# Patient Record
Sex: FEMALE | Employment: FULL TIME | URBAN - NONMETROPOLITAN AREA
[De-identification: names, ages, dates, MRNs, and addresses within clinical notes are randomized per-mention and may not be internally consistent; named-entity substitution may affect disease eponyms.]

---

## 2019-04-19 ENCOUNTER — OFFICE VISIT (OUTPATIENT)
Dept: FAMILY MEDICINE | Facility: OTHER | Age: 29
End: 2019-04-19
Attending: PHYSICIAN ASSISTANT
Payer: COMMERCIAL

## 2019-04-19 VITALS
DIASTOLIC BLOOD PRESSURE: 80 MMHG | RESPIRATION RATE: 18 BRPM | HEART RATE: 80 BPM | HEIGHT: 65 IN | WEIGHT: 125.4 LBS | SYSTOLIC BLOOD PRESSURE: 102 MMHG | BODY MASS INDEX: 20.89 KG/M2

## 2019-04-19 DIAGNOSIS — R19.6 HALITOSIS: ICD-10-CM

## 2019-04-19 DIAGNOSIS — N89.8 VAGINAL DISCHARGE: ICD-10-CM

## 2019-04-19 DIAGNOSIS — Z11.51 SCREENING FOR HUMAN PAPILLOMAVIRUS: ICD-10-CM

## 2019-04-19 DIAGNOSIS — Z00.00 ROUTINE GENERAL MEDICAL EXAMINATION AT A HEALTH CARE FACILITY: Primary | ICD-10-CM

## 2019-04-19 DIAGNOSIS — Z86.32 HISTORY OF GESTATIONAL DIABETES MELLITUS: ICD-10-CM

## 2019-04-19 DIAGNOSIS — Z12.4 SCREENING FOR CERVICAL CANCER: ICD-10-CM

## 2019-04-19 LAB
ANION GAP SERPL CALCULATED.3IONS-SCNC: 6 MMOL/L (ref 3–14)
BUN SERPL-MCNC: 11 MG/DL (ref 7–25)
CALCIUM SERPL-MCNC: 9.3 MG/DL (ref 8.6–10.3)
CHLORIDE SERPL-SCNC: 105 MMOL/L (ref 98–107)
CO2 SERPL-SCNC: 27 MMOL/L (ref 21–31)
CREAT SERPL-MCNC: 0.74 MG/DL (ref 0.6–1.2)
GFR SERPL CREATININE-BSD FRML MDRD: >90 ML/MIN/{1.73_M2}
GLUCOSE SERPL-MCNC: 104 MG/DL (ref 70–105)
POTASSIUM SERPL-SCNC: 3.8 MMOL/L (ref 3.5–5.1)
SODIUM SERPL-SCNC: 138 MMOL/L (ref 134–144)
SPECIMEN SOURCE: NORMAL
TSH SERPL DL<=0.05 MIU/L-ACNC: 1.21 IU/ML (ref 0.34–5.6)
WET PREP SPEC: NORMAL

## 2019-04-19 PROCEDURE — 99395 PREV VISIT EST AGE 18-39: CPT | Performed by: NURSE PRACTITIONER

## 2019-04-19 PROCEDURE — 88142 CYTOPATH C/V THIN LAYER: CPT | Performed by: NURSE PRACTITIONER

## 2019-04-19 PROCEDURE — 87624 HPV HI-RISK TYP POOLED RSLT: CPT | Mod: ZL | Performed by: NURSE PRACTITIONER

## 2019-04-19 PROCEDURE — G0463 HOSPITAL OUTPT CLINIC VISIT: HCPCS

## 2019-04-19 PROCEDURE — 36415 COLL VENOUS BLD VENIPUNCTURE: CPT | Mod: ZL | Performed by: NURSE PRACTITIONER

## 2019-04-19 PROCEDURE — G0123 SCREEN CERV/VAG THIN LAYER: HCPCS | Performed by: NURSE PRACTITIONER

## 2019-04-19 PROCEDURE — 87210 SMEAR WET MOUNT SALINE/INK: CPT | Mod: ZL | Performed by: NURSE PRACTITIONER

## 2019-04-19 PROCEDURE — 80048 BASIC METABOLIC PNL TOTAL CA: CPT | Mod: ZL | Performed by: NURSE PRACTITIONER

## 2019-04-19 PROCEDURE — 84443 ASSAY THYROID STIM HORMONE: CPT | Mod: ZL | Performed by: NURSE PRACTITIONER

## 2019-04-19 RX ORDER — IBUPROFEN 600 MG/1
600 TABLET, FILM COATED ORAL
COMMUNITY
Start: 2016-10-22 | End: 2021-10-12

## 2019-04-19 ASSESSMENT — ANXIETY QUESTIONNAIRES
2. NOT BEING ABLE TO STOP OR CONTROL WORRYING: NOT AT ALL
IF YOU CHECKED OFF ANY PROBLEMS ON THIS QUESTIONNAIRE, HOW DIFFICULT HAVE THESE PROBLEMS MADE IT FOR YOU TO DO YOUR WORK, TAKE CARE OF THINGS AT HOME, OR GET ALONG WITH OTHER PEOPLE: NOT DIFFICULT AT ALL
7. FEELING AFRAID AS IF SOMETHING AWFUL MIGHT HAPPEN: NOT AT ALL
3. WORRYING TOO MUCH ABOUT DIFFERENT THINGS: NOT AT ALL
5. BEING SO RESTLESS THAT IT IS HARD TO SIT STILL: NOT AT ALL
6. BECOMING EASILY ANNOYED OR IRRITABLE: NOT AT ALL
1. FEELING NERVOUS, ANXIOUS, OR ON EDGE: NOT AT ALL
4. TROUBLE RELAXING: NOT AT ALL
GAD7 TOTAL SCORE: 0

## 2019-04-19 ASSESSMENT — PATIENT HEALTH QUESTIONNAIRE - PHQ9: SUM OF ALL RESPONSES TO PHQ QUESTIONS 1-9: 0

## 2019-04-19 ASSESSMENT — MIFFLIN-ST. JEOR: SCORE: 1289.69

## 2019-04-19 ASSESSMENT — PAIN SCALES - GENERAL: PAINLEVEL: NO PAIN (0)

## 2019-04-19 NOTE — NURSING NOTE
Patient presents to the clinic for a physical and pap.    Medication Reconciliation Completed.    Dmitry Mcelroy LPN  4/19/2019 12:53 PM

## 2019-04-19 NOTE — PROGRESS NOTES
SUBJECTIVE:   CC: Leonor Oconnell is an 28 year old woman who presents for preventive health visit.     She has 2 concerns today.  First, is halitosis.  She has been to the dentist and there are no concerns.  She has tried mouthwash, gum.  She denies any sinus symptoms including rhinorrhea, congestion, sinus pressure, postnasal drainage.  Denies any allergy symptoms including rhinorrhea, watery eyes, sneezing.  She does get tonsil stones.  Denies symptoms of sleep apnea including snoring, frequent nighttime wakening, not feeling rested in the mornings.    Her second concern is vaginal discharge.  Has been going on for a while.  Has a slight odor.  Occasional vaginal itching.  Denies urinary symptoms including urinary frequency, dysuria, hematuria.    She has a history of gestational diabetes that was diet controlled.  She does have concerns about diabetes, she feels like there are times when her blood sugar feels low.  Symptoms include feeling shaky.    Her last Pap was in 2014, normal Pap.  Denies history of abnormal Pap.  She does not have any breast concerns.    Immunizations are up-to-date.    Healthy Habits:    Do you get at least three servings of calcium containing foods daily (dairy, green leafy vegetables, etc.)? yes    Amount of exercise or daily activities, outside of work: no routine exercise, walks    Problems taking medications regularly not applicable    Medication side effects: not applicable    Have you had an eye exam in the past two years? no    Do you see a dentist twice per year? yes    Do you have sleep apnea, excessive snoring or daytime drowsiness?no      Today's PHQ-2 Score:   PHQ-2 ( 1999 Pfizer) 4/19/2019   Q1: Little interest or pleasure in doing things 0   Q2: Feeling down, depressed or hopeless 0   PHQ-2 Score 0     Do you feel safe in your environment? Yes    Social History     Tobacco Use     Smoking status: Never Smoker     Smokeless tobacco: Never Used   Substance Use Topics      Alcohol use: Yes     Comment: Alcoholic Drinks/day: not often     If you drink alcohol do you typically have >3 drinks per day or >7 drinks per week? No                     Reviewed orders with patient.  Reviewed health maintenance and updated orders accordingly - Yes  Labs reviewed in EPIC  BP Readings from Last 3 Encounters:   04/19/19 102/80   05/13/15 100/60   09/30/14 112/70    Wt Readings from Last 3 Encounters:   04/19/19 56.9 kg (125 lb 6.4 oz)   09/30/14 57.8 kg (127 lb 6.4 oz)   08/19/14 56 kg (123 lb 6.4 oz)                  Patient Active Problem List   Diagnosis     Gestational diabetes mellitus (GDM) in third trimester, gestational diabetes method of control unspecified     Multigravida     Term pregnancy delivered     Past Surgical History:   Procedure Laterality Date     ESOPHAGOSCOPY, GASTROSCOPY, DUODENOSCOPY (EGD), COMBINED      1999,to retreive swallowed quarter       Social History     Tobacco Use     Smoking status: Never Smoker     Smokeless tobacco: Never Used   Substance Use Topics     Alcohol use: Yes     Comment: Alcoholic Drinks/day: not often     Family History   Problem Relation Age of Onset     Hypertension Father         Hypertension     Hyperlipidemia Father         Hyperlipidemia     Diabetes Paternal Grandmother         Diabetes     Diabetes Paternal Aunt         Diabetes         Current Outpatient Medications   Medication Sig Dispense Refill     ibuprofen (ADVIL/MOTRIN) 600 MG tablet Take 600 mg by mouth       Allergies   Allergen Reactions     Cats      Other reaction(s): Sneezing     Dogs      Other reaction(s): Sneezing       Mammogram not appropriate for this patient based on age.    Pertinent mammograms are reviewed under the imaging tab.  History of abnormal Pap smear: NO - age 30-65 PAP every 5 years with negative HPV co-testing recommended     Reviewed and updated as needed this visit by clinical staff  Tobacco  Allergies  Meds  Problems  Med Hx  Surg Hx  Fam Hx   "Soc Hx          Reviewed and updated as needed this visit by Provider            ROS:  CONSTITUTIONAL: NEGATIVE for fever, chills, change in weight  INTEGUMENTARU/SKIN: NEGATIVE for worrisome rashes, moles or lesions  EYES: NEGATIVE for vision changes or irritation  ENT: POSITIVE for halitosis NEGATIVE for ear, mouth and throat problems  RESP: NEGATIVE for significant cough or SOB  BREAST: NEGATIVE for masses, tenderness or discharge  CV: NEGATIVE for chest pain, palpitations or peripheral edema  GI: NEGATIVE for nausea, abdominal pain, heartburn, or change in bowel habits  : POSITIVE for vaginal symptoms (HPI) NEGATIVE for unusual urinary symptoms. Periods are regular.  MUSCULOSKELETAL: NEGATIVE for significant arthralgias or myalgia  NEURO: NEGATIVE for weakness, dizziness or paresthesias  PSYCHIATRIC: NEGATIVE for changes in mood or affect    OBJECTIVE:   /80 (BP Location: Right arm, Patient Position: Sitting, Cuff Size: Adult Large)   Pulse 80   Resp 18   Ht 1.651 m (5' 5\")   Wt 56.9 kg (125 lb 6.4 oz)   Breastfeeding? No   BMI 20.87 kg/m       EXAM:  GENERAL: healthy, alert and no distress  EYES: Eyes grossly normal to inspection, PERRLA and conjunctivae and sclerae normal  HENT: ear canals and TM's normal, nose and mouth without ulcers or lesions  NECK: no adenopathy, no asymmetry, masses, or scars  RESP: lungs clear to auscultation - no rales, rhonchi or wheezes  BREAST: normal without masses, tenderness or nipple discharge and no palpable axillary masses or adenopathy  CV: regular rate and rhythm, normal S1 S2, no S3 or S4, no murmur, click or rub, no peripheral edema  ABDOMEN: soft, nontender, no hepatosplenomegaly, no masses and bowel sounds normal   (female): normal female external genitalia, normal urethral meatus, vaginal mucosa pink, moist, well rugated, and normal cervix/adnexa/uterus without masses or discharge  MS: no gross musculoskeletal defects noted, no edema  SKIN: no suspicious " lesions or rashes  NEURO: Normal strength and tone, mentation intact and speech normal  PSYCH: mentation appears normal, affect normal/bright    Diagnostic Test Results:  Results for orders placed or performed in visit on 04/19/19   TSH Reflex GH   Result Value Ref Range    TSH Reflex 1.21 0.34 - 5.60 IU/mL   Basic Metabolic Panel   Result Value Ref Range    Sodium 138 134 - 144 mmol/L    Potassium 3.8 3.5 - 5.1 mmol/L    Chloride 105 98 - 107 mmol/L    Carbon Dioxide 27 21 - 31 mmol/L    Anion Gap 6 3 - 14 mmol/L    Glucose 104 70 - 105 mg/dL    Urea Nitrogen 11 7 - 25 mg/dL    Creatinine 0.74 0.60 - 1.20 mg/dL    GFR Estimate >90 >60 mL/min/[1.73_m2]    GFR Estimate If Black >90 >60 mL/min/[1.73_m2]    Calcium 9.3 8.6 - 10.3 mg/dL   Wet Prep, Genital   Result Value Ref Range    Specimen Description Vagina     Wet Prep No Trichomonas seen     Wet Prep No yeast seen     Wet Prep No clue cells seen        ASSESSMENT/PLAN:   1. Routine general medical examination at a health care facility  Routine  - Basic Metabolic Panel; Future  - TSH Reflex GH; Future    2. Halitosis  Chronic. Has been to the dentist and has tried several things at home without relief. Discuss that next option would be further evaluating possible tonsil/sinus issues that could be contributing. I did discuss with her that more often than not a specific cause is not found and management is through the things she is currently already doing.  - OTOLARYNGOLOGY REFERRAL    3. Vaginal discharge  Chronic but since being seen for PAP would like to discuss. Wet prep is normal. Encouraged her to avoid scented detergents/products to see if there is an improvement.   - Wet Prep, Genital    4. Screening for cervical cancer  Routine  - Pap Screen Thin Prep with HPV - recommended age 30 - 65 years (select HPV order below)    5. Screening for human papillomavirus  Routine  - HPV High Risk Types DNA Cervical    6. History of gestational diabetes  "mellitus  Historical. Given history of GDM and symptoms she thinks are related to blood glucose, lab today. BMP with normal glucose. We did discuss need for small frequent meals throughout the day versus going extended periods of time to avoid the shaky feeling of low blood glucose. We also discussed her increased risk of Type II DM given history of GDM- she is not obese, tries to be active and does watch her diet. Encouraged her to continue with those things and follow-up with any concerns.   - Basic Metabolic Panel    COUNSELING:   Reviewed preventive health counseling, as reflected in patient instructions    BP Readings from Last 1 Encounters:   04/19/19 102/80     Estimated body mass index is 20.87 kg/m  as calculated from the following:    Height as of this encounter: 1.651 m (5' 5\").    Weight as of this encounter: 56.9 kg (125 lb 6.4 oz).           reports that she has never smoked. She has never used smokeless tobacco.      Counseling Resources:  ATP IV Guidelines  Pooled Cohorts Equation Calculator  Breast Cancer Risk Calculator  FRAX Risk Assessment  ICSI Preventive Guidelines  Dietary Guidelines for Americans, 2010  USDA's MyPlate  ASA Prophylaxis  Lung CA Screening    Mariia Johnson, CNP  [facility] AND [facility]  "

## 2019-04-20 ASSESSMENT — ANXIETY QUESTIONNAIRES: GAD7 TOTAL SCORE: 0

## 2019-04-25 ENCOUNTER — MYC MEDICAL ADVICE (OUTPATIENT)
Dept: FAMILY MEDICINE | Facility: OTHER | Age: 29
End: 2019-04-25

## 2019-04-25 DIAGNOSIS — B37.31 YEAST INFECTION OF THE VAGINA: Primary | ICD-10-CM

## 2019-04-25 LAB
COPATH REPORT: NORMAL
PAP: NORMAL

## 2019-04-25 RX ORDER — FLUCONAZOLE 150 MG/1
150 TABLET ORAL ONCE
Qty: 1 TABLET | Refills: 0 | Status: SHIPPED | OUTPATIENT
Start: 2019-04-25 | End: 2019-04-25

## 2019-04-26 LAB
FINAL DIAGNOSIS: ABNORMAL
HPV HR 12 DNA CVX QL NAA+PROBE: POSITIVE
HPV16 DNA SPEC QL NAA+PROBE: NEGATIVE
HPV18 DNA SPEC QL NAA+PROBE: NEGATIVE
SPECIMEN DESCRIPTION: ABNORMAL
SPECIMEN SOURCE CVX/VAG CYTO: ABNORMAL

## 2019-06-11 ENCOUNTER — OFFICE VISIT (OUTPATIENT)
Dept: OTOLARYNGOLOGY | Facility: OTHER | Age: 29
End: 2019-06-11
Attending: OTOLARYNGOLOGY
Payer: COMMERCIAL

## 2019-06-11 DIAGNOSIS — R19.6 HALITOSIS: Primary | ICD-10-CM

## 2019-06-11 PROCEDURE — G0463 HOSPITAL OUTPT CLINIC VISIT: HCPCS

## 2019-06-18 NOTE — PROGRESS NOTES
document embedded image  Patient Name:  Leonor Oconnell  YOB: 1990  MR Number:  [id number]  Acct Number: [id number]    cc: ~    ENT Progress Note    Date: 06/11/19    Visit Reasons: Halitosis      HPI: Chief complaint:  Halitosis    History  The patient is a 28-year-old female who comes to the office today with concerns regarding chronic halitosis.  She has seen her dentist and has had her teeth and gums cleared as a potential source of her bad breath.  She notices some thick hypopharyngeal secretions.  She does not have midfacial pain or pressure.  She has no purulence nasal drainage.  She denies chronic pain in her tonsils but does have occasional tonsillith production.  She has no symptomatic GERD.    Exam  Nasal-her septum is midline.  Her nasal membranes are uninflamed.  There is no purulence.  Neck-no masses or adenopathy  Oral cavity oropharynx-her tonsils are small without debris noted at this time  Head and neck integument-Clear  Indirect laryngoscopy-she does have some posterior laryngeal erythema.  I see no mucosal lesions are neuro deficits.  General-the patient appears well and in no distress  Neuro-there are no focal cranial nerve deficits      PFSH:     Medical History    Diagnosis unknown (Acute)  No eCW History       A&P  Assessment & Plan  (1) Halitosis:         Code(s):  R19.6 - Halitosis        I cannot determine a clear etiology for her symptoms on exam.  We will put her on a trial of acid suppression for 3 months.  She is welcome to return to discuss her issues further should her symptoms persist.  Departure  Other Medications:        New:    omeprazole magnesium (Acid Reducer (omeprazole)) 20 mg  PO DAILY 90 caps 0RF            Alexey Ward MD              06/11/19 1236   <Electronically signed by Alexey Ward MD> 06/11/19 1234

## 2020-03-11 ENCOUNTER — HEALTH MAINTENANCE LETTER (OUTPATIENT)
Age: 30
End: 2020-03-11

## 2020-12-27 ENCOUNTER — HEALTH MAINTENANCE LETTER (OUTPATIENT)
Age: 30
End: 2020-12-27

## 2021-02-20 ENCOUNTER — NURSE TRIAGE (OUTPATIENT)
Dept: NURSING | Facility: CLINIC | Age: 31
End: 2021-02-20

## 2021-02-20 ENCOUNTER — VIRTUAL VISIT (OUTPATIENT)
Dept: FAMILY MEDICINE | Facility: OTHER | Age: 31
End: 2021-02-20

## 2021-02-20 NOTE — PROGRESS NOTES
"Date: 2021 16:32:04  Clinician: Sujit Oconnell  Clinician NPI: [id number]  Patient: marli Oconnell  Patient : 1990  Patient Address: [address] [place] [address]  Patient Phone: [phone]  Visit Protocol: Oral mouth sores  Patient Summary:  marli is a 30 year old ( : 1990 ) female who initiated a [facility] Visit for cold sores. When asked the question \"Please sign me up to receive news, health information and promotions from [facility].\", marli responded \"No\".    Images of her skin condition were uploaded.   marli has not previously been diagnosed with cold sores (herpes simplex 1).   Symptom details  marli has active sores. Her symptoms started today. The symptoms consist of pain, tingling, and burning.   The sores are located on the lips and outside the mouth, near the lips on one side of the face. The sores are located together, in a cluster. She has 1 to 2 sores.   She denies feeling feverish. marli is not experiencing similar rash or sores on other parts of the body.   Precipitating events  marli experienced pain or unusual sensations (such as itching, burning, or tingling) in the location of sores/rash before it appeared.   Pertinent medical history  Her last outbreak was 1 to 3 months ago. She has had 2 outbreak(s) in the past three months. Her sores are typically recurrent.   marli has not taken any prescription medication to treat the sores in the past. marli has not taken oral antivirals for long-term suppression of the oral sores in the past 12 months.   marli does not have diabetes. She also denies having immunosuppressive conditions (e.g., chemotherapy, HIV, organ transplant, long-term use of steroids or other immunosuppressive medications, splenectomy).   marli does not need a return to work/school note.   marli does not smoke or use smokeless tobacco.   She denies pregnancy and denies breastfeeding. She has menstruated in the past month.     MEDICATIONS: No current " medications, ALLERGIES: NKDA  Clinician Response:  Dear marli,  Based on the information provided, the lesions or sores you have are most likely cold sores, also known as fever blisters.  The technical term is 'herpes simplex virus type 1'. Common symptoms include a tingling sensation on the mouth or lips, fluid filled blisters, or crusting on the skin.  A person who has this condition can transmit the infection to others through direct contact. After you have had cold sores, the virus remains dormant in your skin cells and can come back at a later time causing another cold sore.   Medication information  I am prescribing:     Valacyclovir 1 gram oral tablet. Take 2 tablets by mouth every 12 hours for 1 day. If you think your oral sores are returning, refill the medication and use it according to the instructions. Your prescription includes 1 refill.   If you become pregnant during this course of treatment with medication, stop taking the medication and contact your primary care provider.   Self care  To reduce the spread of the sores to other people and to other parts of your body, please take the following precautions:      Try not to itch or scratch the sores    If the lesions are draining, keep them covered    Be sure to wash your hands with soap and water often and after touching the sores    Wash towels and bed linens in hot water and dry them on high heat    When you have active sores:    Avoid direct contact such as kissing    Do not share silverware, cups, or towels           When to seek care  Please make an appointment to be seen in a clinic or urgent care if any of the following occur:     Your sores do not heal within 2 weeks    You develop new symptoms or your symptoms become worse      Diagnosis: Herpes Simplex (Cold Sores)  Diagnosis ICD: B00.9  Prescription: valacyclovir (Valtrex) 1 gram oral tablet 4 tablet, 1 days supply. Take 2 tablets by mouth every 12 hours for 1 day. Refills: 1, Refill as  needed: no, Allow substitutions: yes  Pharmacy: [id number] - [phone] - [address] [address]

## 2021-02-20 NOTE — TELEPHONE ENCOUNTER
Trying to get a prescription for cold sores. . Woke up today with a large cold sore. Cold sore is swollen, blistery and painful.  Has been using Abreva all day.  Gets frequent cold sores.  Leonor messaged her MD via [facility].  Advised [email or web address].    Reason for Disposition    [1] Herpes sores are a recurrent problem AND [2] caller wants a prescription medicine to take the next time they occur    Protocols used: COLD SORES (FEVER BLISTERS)-A-

## 2021-10-09 ENCOUNTER — HEALTH MAINTENANCE LETTER (OUTPATIENT)
Age: 31
End: 2021-10-09

## 2021-10-12 ENCOUNTER — OFFICE VISIT (OUTPATIENT)
Dept: FAMILY MEDICINE | Facility: OTHER | Age: 31
End: 2021-10-12
Payer: COMMERCIAL

## 2021-10-12 VITALS
WEIGHT: 130.4 LBS | SYSTOLIC BLOOD PRESSURE: 118 MMHG | BODY MASS INDEX: 21.73 KG/M2 | HEIGHT: 65 IN | OXYGEN SATURATION: 98 % | HEART RATE: 84 BPM | TEMPERATURE: 98.4 F | RESPIRATION RATE: 16 BRPM | DIASTOLIC BLOOD PRESSURE: 64 MMHG

## 2021-10-12 DIAGNOSIS — R05.9 COUGH: Primary | ICD-10-CM

## 2021-10-12 DIAGNOSIS — J01.10 ACUTE NON-RECURRENT FRONTAL SINUSITIS: ICD-10-CM

## 2021-10-12 PROCEDURE — C9803 HOPD COVID-19 SPEC COLLECT: HCPCS

## 2021-10-12 PROCEDURE — 99213 OFFICE O/P EST LOW 20 MIN: CPT | Performed by: FAMILY MEDICINE

## 2021-10-12 PROCEDURE — U0003 INFECTIOUS AGENT DETECTION BY NUCLEIC ACID (DNA OR RNA); SEVERE ACUTE RESPIRATORY SYNDROME CORONAVIRUS 2 (SARS-COV-2) (CORONAVIRUS DISEASE [COVID-19]), AMPLIFIED PROBE TECHNIQUE, MAKING USE OF HIGH THROUGHPUT TECHNOLOGIES AS DESCRIBED BY CMS-2020-01-R: HCPCS | Mod: ZL | Performed by: FAMILY MEDICINE

## 2021-10-12 RX ORDER — PSEUDOEPHEDRINE HCL 60 MG
60 TABLET ORAL EVERY 4 HOURS PRN
Qty: 30 TABLET | Refills: 0 | Status: SHIPPED | OUTPATIENT
Start: 2021-10-12 | End: 2022-02-04

## 2021-10-12 RX ORDER — AMOXICILLIN 875 MG
875 TABLET ORAL 2 TIMES DAILY
Qty: 20 TABLET | Refills: 0 | Status: SHIPPED | OUTPATIENT
Start: 2021-10-12 | End: 2021-10-22

## 2021-10-12 ASSESSMENT — MIFFLIN-ST. JEOR: SCORE: 1297.37

## 2021-10-12 ASSESSMENT — PAIN SCALES - GENERAL: PAINLEVEL: MODERATE PAIN (4)

## 2021-10-12 NOTE — PROGRESS NOTES
"  SUBJECTIVE:   Leonor Oconnell is a 31 year old female who presents to clinic today for the following health issues: Headache sinus congestion    Patient arrives here with a headache.  She points to her temple areas and also frontal area.  States is been going on for couple weeks.  She is not tried any medications.  She has not had a runny nose.  No fevers or chills.  Slight cough.  She has used Tylenol as needed.        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  Physical Exam  Constitutional:       Appearance: Normal appearance.   HENT:      Head: Normocephalic.      Right Ear: Tympanic membrane normal.      Left Ear: Tympanic membrane normal.      Nose: Rhinorrhea present.      Mouth/Throat:      Mouth: Mucous membranes are moist.   Cardiovascular:      Rate and Rhythm: Normal rate.   Pulmonary:      Effort: Pulmonary effort is normal.      Breath sounds: Normal breath sounds.   Neurological:      Mental Status: She is alert.         Diagnostic Test Results:  none     ASSESSMENT/PLAN:         (R05.9) Cough  (primary encounter diagnosis)  Comment: Likely viral URI.  Covid test is pending. "  We will start pseudoephedrine.  If no improvement towards the end of the week begin amoxicillin  Plan: Symptomatic COVID-19 Virus (Coronavirus) by PCR        Nose  (J01.10) Acute non-recurrent frontal sinusitis advised we will get back to patient when  Comment:   Plan: pseudoePHEDrine (SUDAFED) 60 MG tablet,         amoxicillin (AMOXIL) 875 MG tablet                Patrick Corea MD  [facility] [place]

## 2021-10-12 NOTE — NURSING NOTE
Patient here for cough, sinus pressure behind eyes, runny nose stuffy nose. Headache and loss of taste. Medication Reconciliation: complete.    Dayna Hargrove LPN  10/12/2021 9:27 AM

## 2021-10-13 LAB — SARS-COV-2 RNA RESP QL NAA+PROBE: POSITIVE

## 2021-12-28 ENCOUNTER — E-VISIT (OUTPATIENT)
Dept: URGENT CARE | Facility: URGENT CARE | Age: 31
End: 2021-12-28
Payer: COMMERCIAL

## 2021-12-28 DIAGNOSIS — N89.8 VAGINAL DISCHARGE: Primary | ICD-10-CM

## 2021-12-28 PROCEDURE — 99422 OL DIG E/M SVC 11-20 MIN: CPT | Performed by: PREVENTIVE MEDICINE

## 2021-12-28 NOTE — PATIENT INSTRUCTIONS
Thank you for choosing us for your care. Given your symptoms, I would like you to do a lab-only visit to determine what is causing them.  I have placed the orders.  Please schedule an appointment with the lab right here in [place], or call [phone].  I will let you know when the results are back and next steps to take.

## 2022-01-04 ASSESSMENT — ANXIETY QUESTIONNAIRES
GAD7 TOTAL SCORE: 0
2. NOT BEING ABLE TO STOP OR CONTROL WORRYING: NOT AT ALL
7. FEELING AFRAID AS IF SOMETHING AWFUL MIGHT HAPPEN: NOT AT ALL
6. BECOMING EASILY ANNOYED OR IRRITABLE: NOT AT ALL
1. FEELING NERVOUS, ANXIOUS, OR ON EDGE: NOT AT ALL
GAD7 TOTAL SCORE: 0
7. FEELING AFRAID AS IF SOMETHING AWFUL MIGHT HAPPEN: NOT AT ALL
4. TROUBLE RELAXING: NOT AT ALL
3. WORRYING TOO MUCH ABOUT DIFFERENT THINGS: NOT AT ALL
5. BEING SO RESTLESS THAT IT IS HARD TO SIT STILL: NOT AT ALL
GAD7 TOTAL SCORE: 0

## 2022-01-04 ASSESSMENT — PATIENT HEALTH QUESTIONNAIRE - PHQ9
SUM OF ALL RESPONSES TO PHQ QUESTIONS 1-9: 0
10. IF YOU CHECKED OFF ANY PROBLEMS, HOW DIFFICULT HAVE THESE PROBLEMS MADE IT FOR YOU TO DO YOUR WORK, TAKE CARE OF THINGS AT HOME, OR GET ALONG WITH OTHER PEOPLE: NOT DIFFICULT AT ALL
SUM OF ALL RESPONSES TO PHQ QUESTIONS 1-9: 0

## 2022-01-05 ENCOUNTER — OFFICE VISIT (OUTPATIENT)
Dept: FAMILY MEDICINE | Facility: OTHER | Age: 32
End: 2022-01-05
Attending: PHYSICIAN ASSISTANT
Payer: COMMERCIAL

## 2022-01-05 VITALS
RESPIRATION RATE: 16 BRPM | HEIGHT: 65 IN | TEMPERATURE: 98 F | DIASTOLIC BLOOD PRESSURE: 78 MMHG | WEIGHT: 129.6 LBS | HEART RATE: 95 BPM | SYSTOLIC BLOOD PRESSURE: 118 MMHG | BODY MASS INDEX: 21.59 KG/M2 | OXYGEN SATURATION: 99 %

## 2022-01-05 DIAGNOSIS — Z01.419 PAP TEST, AS PART OF ROUTINE GYNECOLOGICAL EXAMINATION: ICD-10-CM

## 2022-01-05 DIAGNOSIS — Z13.1 SCREENING FOR DIABETES MELLITUS: ICD-10-CM

## 2022-01-05 DIAGNOSIS — Z11.3 SCREEN FOR STD (SEXUALLY TRANSMITTED DISEASE): ICD-10-CM

## 2022-01-05 DIAGNOSIS — Z13.220 SCREENING CHOLESTEROL LEVEL: ICD-10-CM

## 2022-01-05 DIAGNOSIS — N89.8 VAGINAL DISCHARGE: ICD-10-CM

## 2022-01-05 DIAGNOSIS — Z00.00 ROUTINE HISTORY AND PHYSICAL EXAMINATION OF ADULT: Primary | ICD-10-CM

## 2022-01-05 PROBLEM — J01.10 ACUTE NON-RECURRENT FRONTAL SINUSITIS: Status: RESOLVED | Noted: 2021-10-12 | Resolved: 2022-01-05

## 2022-01-05 LAB
ANION GAP SERPL CALCULATED.3IONS-SCNC: 7 MMOL/L (ref 3–14)
BUN SERPL-MCNC: 14 MG/DL (ref 7–25)
C TRACH DNA SPEC QL PROBE+SIG AMP: NEGATIVE
CALCIUM SERPL-MCNC: 9.7 MG/DL (ref 8.6–10.3)
CHLORIDE BLD-SCNC: 101 MMOL/L (ref 98–107)
CHOLEST SERPL-MCNC: 216 MG/DL
CLUE CELLS: ABNORMAL
CO2 SERPL-SCNC: 27 MMOL/L (ref 21–31)
CREAT SERPL-MCNC: 0.82 MG/DL (ref 0.6–1.2)
FASTING STATUS PATIENT QL REPORTED: ABNORMAL
GFR SERPL CREATININE-BSD FRML MDRD: >90 ML/MIN/1.73M2
GLUCOSE BLD-MCNC: 93 MG/DL (ref 70–105)
HDLC SERPL-MCNC: 62 MG/DL (ref 23–92)
LDLC SERPL CALC-MCNC: 123 MG/DL
N GONORRHOEA DNA SPEC QL NAA+PROBE: NEGATIVE
NONHDLC SERPL-MCNC: 154 MG/DL
POTASSIUM BLD-SCNC: 4 MMOL/L (ref 3.5–5.1)
SODIUM SERPL-SCNC: 135 MMOL/L (ref 134–144)
TRICHOMONAS, WET PREP: ABNORMAL
TRIGL SERPL-MCNC: 157 MG/DL
WBC'S/HIGH POWER FIELD, WET PREP: ABNORMAL
YEAST, WET PREP: ABNORMAL

## 2022-01-05 PROCEDURE — 87210 SMEAR WET MOUNT SALINE/INK: CPT | Mod: ZL | Performed by: PHYSICIAN ASSISTANT

## 2022-01-05 PROCEDURE — 36415 COLL VENOUS BLD VENIPUNCTURE: CPT | Mod: ZL | Performed by: PHYSICIAN ASSISTANT

## 2022-01-05 PROCEDURE — 82374 ASSAY BLOOD CARBON DIOXIDE: CPT | Mod: ZL | Performed by: PHYSICIAN ASSISTANT

## 2022-01-05 PROCEDURE — 87624 HPV HI-RISK TYP POOLED RSLT: CPT | Mod: ZL | Performed by: PHYSICIAN ASSISTANT

## 2022-01-05 PROCEDURE — 80061 LIPID PANEL: CPT | Mod: ZL | Performed by: PHYSICIAN ASSISTANT

## 2022-01-05 PROCEDURE — G0123 SCREEN CERV/VAG THIN LAYER: HCPCS | Performed by: PHYSICIAN ASSISTANT

## 2022-01-05 PROCEDURE — 82947 ASSAY GLUCOSE BLOOD QUANT: CPT | Mod: ZL | Performed by: PHYSICIAN ASSISTANT

## 2022-01-05 PROCEDURE — 87491 CHLMYD TRACH DNA AMP PROBE: CPT | Mod: ZL | Performed by: PHYSICIAN ASSISTANT

## 2022-01-05 PROCEDURE — 99395 PREV VISIT EST AGE 18-39: CPT | Performed by: PHYSICIAN ASSISTANT

## 2022-01-05 ASSESSMENT — MIFFLIN-ST. JEOR: SCORE: 1293.74

## 2022-01-05 ASSESSMENT — ANXIETY QUESTIONNAIRES: GAD7 TOTAL SCORE: 0

## 2022-01-05 ASSESSMENT — PAIN SCALES - GENERAL: PAINLEVEL: NO PAIN (0)

## 2022-01-05 ASSESSMENT — PATIENT HEALTH QUESTIONNAIRE - PHQ9: SUM OF ALL RESPONSES TO PHQ QUESTIONS 1-9: 0

## 2022-01-05 NOTE — PATIENT INSTRUCTIONS
"Healthy Strategies  1. Eat at least 3 meals a day and never skip breakfast.  2. Eat more slowly.  3. Decrease portion size.  4. Provide structure by using meal replacement bars or shakes, and/or low calorie frozen meals.  5. For good nutrition incorporate fruit, vegetables, whole grains, lean protein, and low-fat dairy.  6. Remove trigger foods from yourenvironment to avoid impulse eating.  7. Increase physical activity: get a pedometer and aim for 10,000 steps a day or 30-35 minutes of activity 5 days per week.  8. Weigh yourself daily or at least weekly.  9. Keep a record of what you eat and your activity.  10. Establish a support system such as afriend, group or program.    11. Read Catracho Squires's \"Eat to Live\". Remember it is important to have a minimum of 1200 calories a day, okay to use olive oil, 40 grams of fiber daily. No more than two servings (the size of your palm) of red meat a week.     Please consider the following general health recommendations:    Eat a quality diet (generally, low in simple sugars, starches, cholesterol and saturated fat.)    Please get 1200 mg of calcium in divided doses with 800 units vitamin D in your diet daily. Take supplements as needed to obtain full recommended amounts.     Stay physically active. Regular walking or other exercise is one of the best ways to minimize pain of arthritis; maintain independence and mobility; maintain bone strength; maintain conditioning of your heart. Find something you enjoy and a friend to do it with you.    Maintain ideal weight. Your Body mass index is Body mass index is 21.57 kg/m .. Generally a BMI of 20-25 is considered ideal. Overweight is defined as 25-30, obese is 30-35 and markedly obese is greater than 35.    Apply sun block (SPF 25 or greater) on exposed skin anytime you are out in the sun to prevent skin cancer.     Wear a seatbelt whenever you are in a car.    Obtain a flu shot every fall.    You should have a tetanus booster at " least once every 10 years.    Check blood sugar annually. Cholesterol annually unless you have had a normal level when last checked within 5 years.     I recommend that you have a general physical exam every year. You should have a pap test every 3 years between the ages of 21 and 30 and every 3-5 years between the ages of 30 and 65 depending on your test unless you have had previous abnormal pap smears, (in these cases the exams and PAP's should be done on a schedule as recommended by your primary care provider). If you have had hysterectomy in the past, your future Pap plan may be different.

## 2022-01-05 NOTE — PROGRESS NOTES
"Nursing Notes:   Lorne Brown LPN  1/5/2022 11:34 AM  Signed  Chief Complaint   Patient presents with     Physical   Patient states she has lump under her left arm that has been there for about six months. The bump has flared up twice in the last six months. No drainage when the bump flares up- painful though.     Initial /78   Pulse 95   Temp 98  F (36.7  C) (Tympanic)   Resp 16   Ht 1.651 m (5' 5\")   Wt 58.8 kg (129 lb 9.6 oz)   LMP 12/22/2021 (Approximate)   SpO2 99%   Breastfeeding No   BMI 21.57 kg/m   Estimated body mass index is 21.57 kg/m  as calculated from the following:    Height as of this encounter: 1.651 m (5' 5\").    Weight as of this encounter: 58.8 kg (129 lb 9.6 oz).  Medication Reconciliation: complete    FOOD SECURITY SCREENING QUESTIONS  Hunger Vital Signs:  Within the past 12 months we worried whether our food would run out before we got money to buy more. Never  Within the past 12 months the food we bought just didn't last and we didn't have money to get more. Never    Advanced Care Directive Reviewed    Lorne Brown LPN        ANNUAL PHYSICAL - FEMALE    HPI: Leonor Oconnell who presents for a yearly exam.  Concerns include: Patient has noticed a small lump in her armpit on the last 6 months.  Gets inflamed and sore at times.   Flared up a month ago.  Not currently flaired.  Approximately pea-sized.  No breast lumps bumps or discharge.  No personal or family history of breast concerns.    Patient is history of high-risk HPV.  Needs her Pap updated.    Concerned about a possible yeast infection.  Over the last 3 months she has tried over-the-counter medication twice.  Symptoms are repeated.  Having some clear vaginal discharge with an odor.  History of bacterial vaginosis.  No rashes.  No STD concerns.    Patient's last menstrual period was 12/22/2021 (approximate).   Contraception: none  Risk for STI?: no, would like to be screened  Last pap: 4/19/2019 - normal pap and " positive HPV, needs to be repeated  Any hx of abnormal paps:  yes  FH of early CA?: no  Cholesterol/DM concerns/screening: needs checked  Tobacco?: no  Calcium intake: yes  DEXA: na  Last mammo: na  Colonoscopy: na  Immunizations: UTD    Answers for HPI/ROS submitted by the patient on 1/4/2022  If you checked off any problems, how difficult have these problems made it for you to do your work, take care of things at home, or get along with other people?: Not difficult at all  PHQ9 TOTAL SCORE: 0  GAVIN 7 TOTAL SCORE: 0  How many servings of fruits and vegetables do you eat daily?: 2-3  On average, how many sweetened beverages do you drink each day (Examples: soda, juice, sweet tea, etc.  Do NOT count diet or artificially sweetened beverages)?: 0  How many minutes a day do you exercise enough to make your heart beat faster?: 9 or less  How many days a week do you exercise enough to make your heart beat faster?: 3 or less  How many days per week do you miss taking your medication?: 0      Patient Active Problem List    Diagnosis Date Noted     Multigravida 04/21/2016     Priority: Medium       Past Medical History:   Diagnosis Date     Gestational diabetes mellitus (GDM) in third trimester, gestational diabetes method of control unspecified 8/12/2016     History of other genital system and obstetric disorders     2011,2-vessel cord; 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     Term pregnancy delivered 10/20/2016       Past Surgical History:   Procedure Laterality Date     ESOPHAGOSCOPY, GASTROSCOPY, DUODENOSCOPY (EGD), COMBINED      1999,to retreive swallowed quarter       Family History   Problem Relation Age of Onset     Hypertension Father         Hypertension     Hyperlipidemia Father         Hyperlipidemia     Diabetes Paternal Grandmother         Diabetes     Diabetes Paternal Aunt         Diabetes       Social History     Tobacco Use     Smoking status: Never Smoker     Smokeless tobacco: Never Used   Substance Use Topics      "Alcohol use: Yes     Comment: Alcoholic Drinks/day: not often       Current Outpatient Medications   Medication Sig Dispense Refill     pseudoePHEDrine (SUDAFED) 60 MG tablet Take 1 tablet (60 mg) by mouth every 4 hours as needed for congestion (Patient not taking: Reported on 1/5/2022) 30 tablet 0       Allergies   Allergen Reactions     Cats      Other reaction(s): Sneezing     Dogs      Other reaction(s): Sneezing       REVIEW OF SYSTEMS:  Refer to HPI.    PHYSICAL EXAM:  /78   Pulse 95   Temp 98  F (36.7  C) (Tympanic)   Resp 16   Ht 1.651 m (5' 5\")   Wt 58.8 kg (129 lb 9.6 oz)   LMP 12/22/2021 (Approximate)   SpO2 99%   Breastfeeding No   BMI 21.57 kg/m    CONSTITUTIONAL:  Alert,cooperative, NAD.  EYES: No scleral icterus.  PERRLA.  Conjunctiva clear.  ENT/MOUTH: External ears and nose normal.  TMs normal.  Moist mucous membranes. Oropharynx clear.    ENDO: No thyromegaly or thyroidnodules.  LYMPH:  No cervical or supraclavicular LA.    BREASTS: No skin abnormalities, no erythema.  No discrete masses.  No nipple discharge, no axillary, supra- or infraclavicular LA.   CARDIOVASCULAR: Regular,S1, S2.  No S3 or S4.  No murmur/gallop/rub.  No peripheral edema.  RESPIRATORY: CTA bilaterally, no wheezes, rhonchi or rales.  GI: Bowel sounds wnl.  Soft, nontender, nondistended.  No masses or HSM.  No rebound or guarding.  : Vulva: normal, no lesions or discharge  Urethral meatus: normal size and location, no lesions or discharge  Urethra: no tenderness or masses  Bladder: no fullness or tenderness  Vagina: normal appearance, no abnormal discharge, no lesions.  No evidence of cystocele or rectocele.  Cervix: normal appearance, no lesions, no abnormal discharge, no cervical motion tenderness  Uterus: normal size and position, mobile, non-tender  Adnexa: no palpable masses bilaterally. No cervical motion tenderness.  Pap smear obtained: yes  MSKEL: Grossly normal ROM.  No clubbing.  INTEGUMENTARY:  Warm, " dry.  No rash noted on exposed skin.  NEUROLOGIC: Facies symmetric.  Grossly normal movement and tone.  No tremor.  PSYCHIATRIC: Affect normal.  Speech fluent.      PHQ Depression Screen  PHQ-9 SCORE 4/19/2019 1/4/2022   PHQ-9 Total Score MyChart - 0   PHQ-9 Total Score 0 0       Labs:  Results for orders placed or performed in visit on 01/05/22   Lipid Panel     Status: Abnormal   Result Value Ref Range    Cholesterol 216 (H) <200 mg/dL    Triglycerides 157 (H) <150 mg/dL    Direct Measure HDL 62 23 - 92 mg/dL    LDL Cholesterol Calculated 123 (H) <=100 mg/dL    Non HDL Cholesterol 154 (H) <130 mg/dL    Patient Fasting > 8hrs? Unknown     Narrative    Cholesterol  Desirable:  <200 mg/dL    Triglycerides  Normal:  Less than 150 mg/dL  Borderline High:  150-199 mg/dL  High:  200-499 mg/dL  Very High:  Greater than or equal to 500 mg/dL    Direct Measure HDL  Female:  Greater than or equal to 50 mg/dL   Male:  Greater than or equal to 40 mg/dL    LDL Cholesterol  Desirable:  <100mg/dL  Above Desirable:  100-129 mg/dL   Borderline High:  130-159 mg/dL   High:  160-189 mg/dL   Very High:  >= 190 mg/dL    Non HDL Cholesterol  Desirable:  130 mg/dL  Above Desirable:  130-159 mg/dL  Borderline High:  160-189 mg/dL  High:  190-219 mg/dL  Very High:  Greater than or equal to 220 mg/dL   Basic Metabolic Panel     Status: Normal   Result Value Ref Range    Sodium 135 134 - 144 mmol/L    Potassium 4.0 3.5 - 5.1 mmol/L    Chloride 101 98 - 107 mmol/L    Carbon Dioxide (CO2) 27 21 - 31 mmol/L    Anion Gap 7 3 - 14 mmol/L    Urea Nitrogen 14 7 - 25 mg/dL    Creatinine 0.82 0.60 - 1.20 mg/dL    Calcium 9.7 8.6 - 10.3 mg/dL    Glucose 93 70 - 105 mg/dL    GFR Estimate >90 >60 mL/min/1.73m2   GC/Chlamydia by PCR     Status: Normal    Specimen: Cervix; Swab   Result Value Ref Range    Chlamydia Trachomatis Negative Negative    Neisseria gonorrhoeae Negative Negative    Narrative    Assay performed using [employer] real-time,  reverse-transcriptase PCR.   Wet Prep, Genital     Status: Abnormal    Specimen: Urethra; Swab   Result Value Ref Range    Trichomonas Absent Absent    Yeast Absent Absent    Clue Cells Absent Absent    WBCs/high power field 2+ (A) None       ASSESSMENT AND PLAN:      ICD-10-CM    1. Routine history and physical examination of adult  Z00.00    2. Pap test, as part of routine gynecological examination  Z01.419 Pap Screen with HPV - recommended age 30 - 65 years     HPV High Risk Types DNA Cervical   3. Screening cholesterol level  Z13.220 Lipid Panel     Lipid Panel   4. Screening for diabetes mellitus  Z13.1 Basic Metabolic Panel     Basic Metabolic Panel   5. Vaginal discharge  N89.8 GC/Chlamydia by PCR     Wet Prep, Genital   6. Screen for STD (sexually transmitted disease)  Z11.3 GC/Chlamydia by PCR     Wet Prep, Genital     Completed Pap and HPV test for cervical cancer screening.  Completed lipid panel and BMP for cholesterol diabetes mellitus screening.  Completed gonorrhea, chlamydia, and vaginal wet prep due to vaginal discharge along with STD screen.  Labs are pending.  Encourage good diet and exercise.  Gave patient education.  Repeat physical in 1 year.    Relevant cancer screening discussed.    Counseled on healthy diet, Calcium and vitamin D intake, and exercise.    Patient Instructions   Healthy Strategies  1. Eat at least 3 meals a day and never skip breakfast.  2. Eat more slowly.  3. Decrease portion size.  4. Provide structure by using meal replacement bars or shakes, and/or low calorie frozen meals.  5. For good nutrition incorporate fruit, vegetables, whole grains, lean protein, and low-fat dairy.  6. Remove trigger foods from yourenvironment to avoid impulse eating.  7. Increase physical activity: get a pedometer and aim for 10,000 steps a day or 30-35 minutes of activity 5 days per week.  8. Weigh yourself daily or at least weekly.  9. Keep a record of what you eat and your  "activity.  10. Establish a support system such as afriend, group or program.    11. Read Catracho Squires's \"Eat to Live\". Remember it is important to have a minimum of 1200 calories a day, okay to use olive oil, 40 grams of fiber daily. No more than two servings (the size of your palm) of red meat a week.     Please consider the following general health recommendations:    Eat a quality diet (generally, low in simple sugars, starches, cholesterol and saturated fat.)    Please get 1200 mg of calcium in divided doses with 800 units vitamin D in your diet daily. Take supplements as needed to obtain full recommended amounts.     Stay physically active. Regular walking or other exercise is one of the best ways to minimize pain of arthritis; maintain independence and mobility; maintain bone strength; maintain conditioning of your heart. Find something you enjoy and a friend to do it with you.    Maintain ideal weight. Your Body mass index is Body mass index is 21.57 kg/m .. Generally a BMI of 20-25 is considered ideal. Overweight is defined as 25-30, obese is 30-35 and markedly obese is greater than 35.    Apply sun block (SPF 25 or greater) on exposed skin anytime you are out in the sun to prevent skin cancer.     Wear a seatbelt whenever you are in a car.    Obtain a flu shot every fall.    You should have a tetanus booster at least once every 10 years.    Check blood sugar annually. Cholesterol annually unless you have had a normal level when last checked within 5 years.     I recommend that you have a general physical exam every year. You should have a pap test every 3 years between the ages of 21 and 30 and every 3-5 years between the ages of 30 and 65 depending on your test unless you have had previous abnormal pap smears, (in these cases the exams and PAP's should be done on a schedule as recommended by your primary care provider). If you have had hysterectomy in the past, your future Pap plan may be different.        "     LEONEL Meneses.................1/5/2022 11:38 AM

## 2022-01-05 NOTE — NURSING NOTE
"Chief Complaint   Patient presents with     Physical   Patient states she has lump under her left arm that has been there for about six months. The bump has flared up twice in the last six months. No drainage when the bump flares up- painful though.     Initial /78   Pulse 95   Temp 98  F (36.7  C) (Tympanic)   Resp 16   Ht 1.651 m (5' 5\")   Wt 58.8 kg (129 lb 9.6 oz)   LMP 12/22/2021 (Approximate)   SpO2 99%   Breastfeeding No   BMI 21.57 kg/m   Estimated body mass index is 21.57 kg/m  as calculated from the following:    Height as of this encounter: 1.651 m (5' 5\").    Weight as of this encounter: 58.8 kg (129 lb 9.6 oz).  Medication Reconciliation: complete    FOOD SECURITY SCREENING QUESTIONS  Hunger Vital Signs:  Within the past 12 months we worried whether our food would run out before we got money to buy more. Never  Within the past 12 months the food we bought just didn't last and we didn't have money to get more. Never    Advanced Care Directive Reviewed    Lorne Brown LPN    "

## 2022-01-11 ENCOUNTER — OFFICE VISIT (OUTPATIENT)
Dept: FAMILY MEDICINE | Facility: OTHER | Age: 32
End: 2022-01-11
Payer: COMMERCIAL

## 2022-01-11 VITALS
OXYGEN SATURATION: 97 % | BODY MASS INDEX: 21.51 KG/M2 | DIASTOLIC BLOOD PRESSURE: 70 MMHG | TEMPERATURE: 98.6 F | SYSTOLIC BLOOD PRESSURE: 122 MMHG | RESPIRATION RATE: 16 BRPM | WEIGHT: 129.25 LBS | HEART RATE: 79 BPM

## 2022-01-11 DIAGNOSIS — N89.8 VAGINAL DISCHARGE: Primary | ICD-10-CM

## 2022-01-11 DIAGNOSIS — R22.31 LUMP IN ARMPIT, RIGHT: ICD-10-CM

## 2022-01-11 PROCEDURE — 99213 OFFICE O/P EST LOW 20 MIN: CPT | Performed by: FAMILY MEDICINE

## 2022-01-11 RX ORDER — METRONIDAZOLE 500 MG/1
500 TABLET ORAL 2 TIMES DAILY
Qty: 14 TABLET | Refills: 0 | Status: SHIPPED | OUTPATIENT
Start: 2022-01-11 | End: 2022-01-24

## 2022-01-11 ASSESSMENT — PAIN SCALES - GENERAL: PAINLEVEL: NO PAIN (0)

## 2022-01-11 NOTE — PATIENT INSTRUCTIONS
Patient Education     Bacterial Vaginosis    You have a vaginal infection called bacterial vaginosis (BV). Both good and bad bacteria are present in a healthy vagina. BV occurs when these bacteria get out of balance. The number of bad bacteria increase. And the number of good bacteria decrease. BV is linked with sexual activity, but it's not a sexually transmitted infection (STI).   BV may or may not cause symptoms. If symptoms do occur, they can include:     Thin, gray, milky-white, or sometimes green discharge    Unpleasant odor or  fishy  smell    Itching, burning, or pain in or around the vagina  It is not known what causes BV, but certain factors can make the problem more likely. These can include:     Douching    Spermicides    Use of antibiotics    Change in hormone levels with pregnancy, breastfeeding, or menopause    Having sex with a new partner    Having sex with more than one partner  BV will sometimes go away on its own. But treatment is often advised. This is because untreated BV can raise the risk of more serious health problems such as:     Pelvic inflammatory disease (PID)     delivery (giving birth to a baby early if you re pregnant)    HIV and some other sexually transmitted infections (STIs)    Infection after surgery on the reproductive organs  Home care  General care    BV is most often treated with medicines called antibiotics. These may be given as pills or as a vaginal cream. If antibiotics are prescribed, be sure to use them exactly as directed. And complete all of the medicine, even if your symptoms go away.    Don't douche or having sex during treatment.    If you have sex with a female partner, ask your healthcare provider if she should also be treated.  Prevention    Don't douche.    Don't have sex. If you do have sex, then take steps to lower your risk:  ? Use condoms when having sex.  ? Limit the number of sex partners you have.    Follow-up care  Follow up with your healthcare  provider, or as advised.   When to get medical advice  Call your healthcare provider right away if:     You have a fever of 100.4 F (38 C) or higher, or as directed by your provider.    Your symptoms get worse, or they don t go away within a few days of starting treatment.    You have new pain in the lower belly or pelvic region.    You have side effects that bother you or a reaction to the pills or cream you re prescribed.    You or any of your sex partners have new symptoms, such as a rash, joint pain, or sores.  [employer] last reviewed this educational content on 6/1/2020 2000-2021 [employer]. All rights reserved. This information is not intended as a substitute for professional medical care. Always follow your healthcare professional's instructions.

## 2022-01-11 NOTE — NURSING NOTE
"Chief Complaint   Patient presents with     Vaginal Problem     Patient presents today as a follow-up on vaginal odor and discharge that she  still has.   Initial /70   Pulse 79   Temp 98.6  F (37  C) (Tympanic)   Resp 16   Wt 58.6 kg (129 lb 4 oz)   LMP 12/22/2021 (Approximate)   SpO2 97%   BMI 21.51 kg/m   Estimated body mass index is 21.51 kg/m  as calculated from the following:    Height as of 1/5/22: 1.651 m (5' 5\").    Weight as of this encounter: 58.6 kg (129 lb 4 oz).  Medication Reconciliation: complete    Emerita Cooley LPN     Advanced Care Directive reviewed.     "

## 2022-01-11 NOTE — PROGRESS NOTES
Assessment & Plan     1. Vaginal discharge  Continued symptoms despite OTC treatment.  Discussed risk factors, and handout provided for at home regimens/things to follow.  Normal wet prep reviewed; but clinically sounding like BV and had used Monistat like product, ~4 days prior to test.  Rx for metronidazole.  If not improved in 7 days, consider prolonged course x 1.  Or referral prn.  - metroNIDAZOLE (FLAGYL) 500 MG tablet; Take 1 tablet (500 mg) by mouth 2 times daily for 7 days  Dispense: 14 tablet; Refill: 0    2. Lump in armpit, right  X 6 months.  Was supposed to get US order at last visit.  This was placed today.  - US Axillary Right; Future      Emily Crews, [facility]    Subjective   Leonor is a 31 year old who presents for the following health issues:    HPI     Vaginal Symptoms  Onset/Duration: ~2 months; off & on  Description:  Vaginal Discharge: white-yellowish   Itching (Pruritis): no  Burning sensation:  no  Odor: YES  Accompanying Signs & Symptoms:  Urinary symptoms: no  Abdominal pain: no  Fever: no  History:   Sexually active: YES  New Partner: no  Possibility of Pregnancy:  no  Recent antibiotic use: no  Previous vaginitis issues: YES- years ago.  Improved with a Rx previously.  Precipitating or alleviating factors: None  Therapies tried and outcome: Monistat      Lump in armpit, right  X 6 months.  Was supposed to get US order at last visit.  This was placed today.    Review of Systems   CONSTITUTIONAL: NEGATIVE for fever, chills, change in weight  ENT/MOUTH: NEGATIVE for ear, mouth and throat problems  RESP: NEGATIVE for significant cough or SOB  CV: NEGATIVE for chest pain, palpitations or peripheral edema      Objective    /70   Pulse 79   Temp 98.6  F (37  C) (Tympanic)   Resp 16   Wt 58.6 kg (129 lb 4 oz)   LMP 12/22/2021 (Approximate)   SpO2 97%   BMI 21.51 kg/m    Body mass index is 21.51 kg/m .  Physical Exam   GENERAL: healthy, alert and no  distress  EYES: Eyes grossly normal to inspection  NECK: no adenopathy, no asymmetry, masses, or scars and thyroid normal  ABDOMEN: soft, nontender  SKIN: no suspicious lesions or rashes  MSK: palpable lump, ~2cm in subcutaneous R axilla.  No overlying skin changes besides contour obvious for fullness.  PSYCH: mentation appears normal, affect normal/bright    No results found for any visits on 01/11/22.

## 2022-01-12 LAB
HUMAN PAPILLOMA VIRUS 16 DNA: NEGATIVE
HUMAN PAPILLOMA VIRUS 18 DNA: NEGATIVE
HUMAN PAPILLOMA VIRUS FINAL DIAGNOSIS: ABNORMAL
HUMAN PAPILLOMA VIRUS OTHER HR: POSITIVE

## 2022-01-13 LAB
BKR LAB AP GYN ADEQUACY: NORMAL
BKR LAB AP GYN INTERPRETATION: NORMAL
BKR LAB AP HPV REFLEX: NORMAL
BKR LAB AP LMP: NORMAL
BKR LAB AP PREVIOUS ABNL DX: NORMAL
BKR LAB AP PREVIOUS ABNORMAL: NORMAL
PATH REPORT.COMMENTS IMP SPEC: NORMAL
PATH REPORT.COMMENTS IMP SPEC: NORMAL
PATH REPORT.RELEVANT HX SPEC: NORMAL

## 2022-01-18 ENCOUNTER — TELEPHONE (OUTPATIENT)
Dept: FAMILY MEDICINE | Facility: OTHER | Age: 32
End: 2022-01-18
Payer: COMMERCIAL

## 2022-01-18 DIAGNOSIS — R87.810 CERVICAL HIGH RISK HPV (HUMAN PAPILLOMAVIRUS) TEST POSITIVE: Primary | ICD-10-CM

## 2022-01-18 NOTE — TELEPHONE ENCOUNTER
Your Pap test is normal however your HPV test came back positive.  Since this is your second positive HPV test I would recommend having a colposcopy completed to rule out concerns.  This is a biopsy of your cervix.  I placed a referral to OB/GYN for the procedure. Please take ibuprofen 800 mg 1 hour prior to the procedure for your comfort.   Eufemia Beaver PA-C ..................1/18/2022 3:40 PM

## 2022-01-18 NOTE — TELEPHONE ENCOUNTER
After proper verification, patient was relayed message from below.  Heather Red LPN on 1/18/2022 at 3:54 PM

## 2022-01-19 ENCOUNTER — TELEPHONE (OUTPATIENT)
Dept: FAMILY MEDICINE | Facility: OTHER | Age: 32
End: 2022-01-19
Payer: COMMERCIAL

## 2022-01-19 ENCOUNTER — HOSPITAL ENCOUNTER (OUTPATIENT)
Dept: ULTRASOUND IMAGING | Facility: OTHER | Age: 32
Discharge: HOME | End: 2022-01-19
Attending: FAMILY MEDICINE | Admitting: FAMILY MEDICINE
Payer: COMMERCIAL

## 2022-01-19 DIAGNOSIS — R22.31 LUMP IN ARMPIT, RIGHT: Primary | ICD-10-CM

## 2022-01-19 DIAGNOSIS — R22.31 LUMP IN ARMPIT, RIGHT: ICD-10-CM

## 2022-01-19 PROCEDURE — 76882 US LMTD JT/FCL EVL NVASC XTR: CPT | Mod: RT

## 2022-01-19 NOTE — TELEPHONE ENCOUNTER
Referral to general surgery, Dr. Khan, placed to discuss the lump in the right axilla along with the ultrasound findings.  Called patient to discuss the referral and ultrasound.  Eufemia Beaver PA-C.......... 1/19/2022 1:18 PM

## 2022-01-24 ENCOUNTER — MYC REFILL (OUTPATIENT)
Dept: FAMILY MEDICINE | Facility: OTHER | Age: 32
End: 2022-01-24
Payer: COMMERCIAL

## 2022-01-24 DIAGNOSIS — N89.8 VAGINAL DISCHARGE: ICD-10-CM

## 2022-01-25 RX ORDER — METRONIDAZOLE 500 MG/1
500 TABLET ORAL 2 TIMES DAILY
Qty: 14 TABLET | Refills: 0 | Status: SHIPPED | OUTPATIENT
Start: 2022-01-25 | End: 2022-02-04

## 2022-01-25 NOTE — TELEPHONE ENCOUNTER
"Dr. Crews  You saw patient 1/11/22 (note below) and stated if not improved in 7 days of metronidazole, consider an additional course. Patient requesting refill stating, \"It helped a lot but still didn't go away completely. I just want to make sure it 100% goes away.\"  Rx pending your review, thank you.   Sol Rojas RN on 1/25/2022 at 10:23 AM      1. Vaginal discharge  Continued symptoms despite OTC treatment.  Discussed risk factors, and handout provided for at home regimens/things to follow.  Normal wet prep reviewed; but clinically sounding like BV and had used Monistat like product, ~4 days prior to test.  Rx for metronidazole.  If not improved in 7 days, consider prolonged course x 1.  Or referral prn.  - metroNIDAZOLE (FLAGYL) 500 MG tablet; Take 1 tablet (500 mg) by mouth 2 times daily for 7 days  Dispense: 14 tablet; Refill: 0    Last Prescription Date: 1/11/22  Last Qty/Refills: 14 / 0  Last Office Visit: 1/11/22 [facility]  Future Office Visit: n/a     Requested Prescriptions   Pending Prescriptions Disp Refills     metroNIDAZOLE (FLAGYL) 500 MG tablet 14 tablet 0     Sig: Take 1 tablet (500 mg) by mouth 2 times daily       There is no refill protocol information for this order          "

## 2022-02-04 ENCOUNTER — OFFICE VISIT (OUTPATIENT)
Dept: SURGERY | Facility: OTHER | Age: 32
End: 2022-02-04
Attending: PHYSICIAN ASSISTANT
Payer: COMMERCIAL

## 2022-02-04 ENCOUNTER — OFFICE VISIT (OUTPATIENT)
Dept: OBGYN | Facility: OTHER | Age: 32
End: 2022-02-04
Attending: PHYSICIAN ASSISTANT
Payer: COMMERCIAL

## 2022-02-04 VITALS
HEART RATE: 82 BPM | DIASTOLIC BLOOD PRESSURE: 68 MMHG | BODY MASS INDEX: 21.13 KG/M2 | WEIGHT: 127 LBS | SYSTOLIC BLOOD PRESSURE: 100 MMHG | OXYGEN SATURATION: 98 % | RESPIRATION RATE: 16 BRPM | TEMPERATURE: 97.8 F

## 2022-02-04 VITALS
HEART RATE: 82 BPM | BODY MASS INDEX: 21.13 KG/M2 | WEIGHT: 127 LBS | DIASTOLIC BLOOD PRESSURE: 68 MMHG | SYSTOLIC BLOOD PRESSURE: 100 MMHG

## 2022-02-04 DIAGNOSIS — Z01.812 PRE-PROCEDURE LAB EXAM: ICD-10-CM

## 2022-02-04 DIAGNOSIS — R87.810 CERVICAL HIGH RISK HPV (HUMAN PAPILLOMAVIRUS) TEST POSITIVE: Primary | ICD-10-CM

## 2022-02-04 DIAGNOSIS — R22.31 LUMP IN ARMPIT, RIGHT: Primary | ICD-10-CM

## 2022-02-04 LAB — HCG UR QL: NEGATIVE

## 2022-02-04 PROCEDURE — 88305 TISSUE EXAM BY PATHOLOGIST: CPT

## 2022-02-04 PROCEDURE — 57454 BX/CURETT OF CERVIX W/SCOPE: CPT | Performed by: OBSTETRICS & GYNECOLOGY

## 2022-02-04 PROCEDURE — 88342 IMHCHEM/IMCYTCHM 1ST ANTB: CPT

## 2022-02-04 PROCEDURE — 81025 URINE PREGNANCY TEST: CPT | Mod: ZL | Performed by: OBSTETRICS & GYNECOLOGY

## 2022-02-04 PROCEDURE — 99243 OFF/OP CNSLTJ NEW/EST LOW 30: CPT | Performed by: SURGERY

## 2022-02-04 ASSESSMENT — PAIN SCALES - GENERAL
PAINLEVEL: MILD PAIN (2)
PAINLEVEL: NO PAIN (0)

## 2022-02-04 NOTE — PROGRESS NOTES
Primary Care Physician: Physician Luba Ref-Primary    I was requested to see this patient in consultation by Dr. Crews for evaluation of right axillar lump. A copy of this note will be sent to Dr. Crews.  HPI:   The patient is 31 year old female with a persistent lump in her right armpit. It has been smaller and larger at times. It isn't related to shaving or to her hormone cycles. It's not supper sore but is tender. She hasn't had any skin redness or drainage. She had US done that didn't show an enlarged lymph node or a fluid collection.    CONSULTATION ASSESSMENT AND PLAN/RECOMMENDATIONS:   Lump right axilla-I discussed the possibility of this being ectopic breast tissue or inflammation. We discussed excision the tissue for definitive diagnosis. We discussed the risks and the benefits and alternatives. Her questions were answered. She wishes to proceed. This will be scheduled at her convenience. She will have a covid test per protocol. Consent was completed.    REVIEW OF SYSTEMS  GENERAL: No fevers or chills. Denies fatigue, recent weight loss.  HEENT: No sinus drainage. No changes with vision or hearing. No difficulty swallowing.   LYMPHATICS:  No swollen nodes in axilla, neck or groin.  CARDIOVASCULAR: Denies chest pain, palpitations and dyspnea on exertion.  PULMONARY: No shortness of breath or cough. No increase in sputum production.  GI: Denies melena, bright red blood in stools. No hematemesis. No constipation or diarrhea.  : No dysuria or hematuria.  SKIN: No recent rashes or ulcers.   HEMATOLOGY:  No history of easy bruising or bleeding.  ENDOCRINE:  No history of diabetes or thyroid problems.  NEUROLOGY:  No history of seizures or headaches. No motor or sensory changes.  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     No current facility-administered medications for this visit.     Allergies   Allergen Reactions     Cats      Other reaction(s): Sneezing     Dogs      Other reaction(s): Sneezing     Family History   Problem Relation Age of Onset     Hypertension Father         Hypertension     Hyperlipidemia Father         Hyperlipidemia     Diabetes Paternal Grandmother         Diabetes     Diabetes Paternal Aunt         Diabetes     Social History     Socioeconomic History     Marital status: Single     Spouse name: None     Number of children: None     Years of education: None     Highest education level: None   Occupational History     None   Tobacco Use     Smoking status: Never Smoker     Smokeless tobacco: Never Used   Vaping Use     Vaping Use: Never used   Substance and Sexual Activity     Alcohol use: Yes     Comment: monthly or less     Drug use: Never     Sexual activity: Yes     Partners: Male     Birth control/protection: None   Other Topics Concern     None   Social History Narrative     None     Social Determinants of Health     Financial Resource Strain: Not on file   Food Insecurity: Not on file   Transportation Needs: Not on file   Physical Activity: Not on file   Stress: Not on file   Social Connections: Not on file   Intimate Partner Violence: Not on file   Housing Stability: Not on file     EXAM:  Vitals: /68 (BP Location: Right arm, Patient Position: Sitting, Cuff Size: Adult Regular)   Pulse 82   Temp 97.8  F (36.6  C) (Tympanic)   Resp 16   Wt 57.6 kg (127 lb)   LMP 01/19/2022 (Approximate)   SpO2 98%   BMI 21.13 kg/m    GENERAL: Healthy appearing patient in no acute distress. Pleasant and cooperativewith  exam and interview.   HEENT: Head-normocephalic. Eyes-no scleral icterus, pupils equal, round, and reactive to light. Nose-no nasal drainage. No lesions. Mouth-oral mucosa pink and moist, no lesions.  NECK:Supple. No thyroid nodules. Trachea midline.  LYMPHATICS:  No cervical, axillary or supraclavicular adenopathy. Right soft tissue nodule 1.5 x 1.0 cm non-erythematous. No fluctuance.  CV: Regular rate and rhythm, no murmurs. No peripheral edema.  LUNGS:  No respiratory distress. Clear bilaterally to auscultation.  SKIN: Pink, warm and dry. No jaundice. No rash.  NEURO:  Cranial nerves II-XII grossly intact. Alert and oriented.  PSYCH: Appropriate mood and affect.    IMAGING/LAB  I personally reviewed patient's US images of her right axilla

## 2022-02-04 NOTE — NURSING NOTE
Chief Complaint   Patient presents with     Colposcopy       Medication Reconciliation: complete   Prior to the start of the procedure and with procedural staff participation, I verbally confirmed the patient s identity using two indicators, relevant allergies, that the procedure was appropriate and matched the consent or emergent situation, and that the correct equipment/implants were available. Immediately prior to starting the procedure I conducted the Time Out with the procedural staff and re-confirmed the patient s name, procedure, and site/side. (The Joint Commission universal protocol was followed.)  Yes    Sedation (Moderate or Deep): None      Rossana Montanez LPN........................2/4/2022  1:20 PM

## 2022-02-04 NOTE — NURSING NOTE
"Chief Complaint   Patient presents with     Consult     right armpit lump       Initial /68 (BP Location: Right arm, Patient Position: Sitting, Cuff Size: Adult Regular)   Pulse 82   Temp 97.8  F (36.6  C) (Tympanic)   Resp 16   Wt 57.6 kg (127 lb)   LMP 01/19/2022 (Approximate)   SpO2 98%   BMI 21.13 kg/m   Estimated body mass index is 21.13 kg/m  as calculated from the following:    Height as of 1/5/22: 1.651 m (5' 5\").    Weight as of this encounter: 57.6 kg (127 lb).  Medication Reconciliation: complete    How many children do you have? 2  What age did your menstrual cycle start? 9  How old were you when your first child was born? 20  Did you breast feed? yes  Are you on or have you ever taken any hormone replacement or birth control? Birth control  Do you have a family history of breast cancer? no  Maia Mayo LPN..........2/4/2022  10:07 AM      "

## 2022-02-04 NOTE — PROGRESS NOTES
"Colposcopy Procedure Note    31 year old  female presents for colposcopy due to pap smear on 1/5/22 showing NIL but HPV+ not 16, 18. She had the same abnormality on her pap 4/2019.      Cervical Cancer Risk Factors:  - Smoking: never  - Sexual partners: monogamous x11 years  - Immunosuppression: no  - HPV vaccination status: unvaccinated    Time Out - \"Pause for the Cause\"  Just before the procedure began the following was verified:    Initials   Patient Name    Leonor Oconnell    Patient Date of Birth 1990    Procedure to be performed  Colposcopy   Procedure for colposcopy and biopsy has been explained to the patient. Consent:  Risks, benefits of treatment, and no treatment were discussed.  Patient's questions were elicited and answered.     Procedure:  Speculum placed in vagina and excellent visualization of cervix  achieved. Cervix swabbed with acetic acid solution. Circumferential acetowhite changes around the os with no increased vascularity. Cervix then swabbed with Lugol's solution. Circumferential decreased uptake around the os with sharper demarcation from 12-2 o'clock and 4-7 o'clock. Biopsies were taken at 1 and 6 o'clock. ECC performed and cells collected with cytobrush.     Assessment/Plan:   Ms. Leonor Oconnell is a 31 year old P2 with persistent HPV    -Specimens sent to pathology  -Will base further treatment on pathology findings.  -Post biopsy instructions given to patient    Bernadine Harris MD  OB/GYN  2/4/2022 12:58 PM     "

## 2022-02-05 ENCOUNTER — ALLIED HEALTH/NURSE VISIT (OUTPATIENT)
Dept: FAMILY MEDICINE | Facility: OTHER | Age: 32
End: 2022-02-05
Attending: FAMILY MEDICINE
Payer: COMMERCIAL

## 2022-02-05 DIAGNOSIS — R22.31 LUMP IN ARMPIT, RIGHT: ICD-10-CM

## 2022-02-05 PROCEDURE — C9803 HOPD COVID-19 SPEC COLLECT: HCPCS

## 2022-02-05 PROCEDURE — U0005 INFEC AGEN DETEC AMPLI PROBE: HCPCS | Mod: ZL

## 2022-02-05 NOTE — PROGRESS NOTES
Patient here for Covid Testing. Pre op 2/9/22 SHIRA Khan.    Marlene Galindo MA on 2/5/2022 at 10:18 AM

## 2022-02-06 LAB — SARS-COV-2 RNA RESP QL NAA+PROBE: NEGATIVE

## 2022-02-09 LAB
PATH REPORT.COMMENTS IMP SPEC: NORMAL
PATH REPORT.FINAL DX SPEC: NORMAL
PHOTO IMAGE: NORMAL

## 2022-02-12 ENCOUNTER — ALLIED HEALTH/NURSE VISIT (OUTPATIENT)
Dept: FAMILY MEDICINE | Facility: OTHER | Age: 32
End: 2022-02-12
Attending: PHYSICIAN ASSISTANT
Payer: COMMERCIAL

## 2022-02-12 DIAGNOSIS — Z20.822 COVID-19 RULED OUT: Primary | ICD-10-CM

## 2022-02-12 PROCEDURE — C9803 HOPD COVID-19 SPEC COLLECT: HCPCS

## 2022-02-12 PROCEDURE — U0003 INFECTIOUS AGENT DETECTION BY NUCLEIC ACID (DNA OR RNA); SEVERE ACUTE RESPIRATORY SYNDROME CORONAVIRUS 2 (SARS-COV-2) (CORONAVIRUS DISEASE [COVID-19]), AMPLIFIED PROBE TECHNIQUE, MAKING USE OF HIGH THROUGHPUT TECHNOLOGIES AS DESCRIBED BY CMS-2020-01-R: HCPCS | Mod: ZL

## 2022-02-12 NOTE — PROGRESS NOTES
Patient swabbed for COVID-19 testing.  Dee Reid MA on 2/12/2022 at 11:46 AM  Procedure 2/16/22 SHIRA

## 2022-02-13 LAB — SARS-COV-2 RNA RESP QL NAA+PROBE: NEGATIVE

## 2022-02-15 ENCOUNTER — ANESTHESIA EVENT (OUTPATIENT)
Dept: SURGERY | Facility: OTHER | Age: 32
End: 2022-02-15
Payer: COMMERCIAL

## 2022-02-16 ENCOUNTER — HOSPITAL ENCOUNTER (OUTPATIENT)
Facility: OTHER | Age: 32
Discharge: HOME | End: 2022-02-16
Attending: SURGERY | Admitting: SURGERY
Payer: COMMERCIAL

## 2022-02-16 ENCOUNTER — ANESTHESIA (OUTPATIENT)
Dept: SURGERY | Facility: OTHER | Age: 32
End: 2022-02-16
Payer: COMMERCIAL

## 2022-02-16 VITALS
BODY MASS INDEX: 20.41 KG/M2 | HEART RATE: 56 BPM | SYSTOLIC BLOOD PRESSURE: 112 MMHG | OXYGEN SATURATION: 100 % | RESPIRATION RATE: 10 BRPM | TEMPERATURE: 97 F | DIASTOLIC BLOOD PRESSURE: 84 MMHG | HEIGHT: 66 IN | WEIGHT: 127 LBS

## 2022-02-16 DIAGNOSIS — R22.31 AXILLARY MASS, RIGHT: Primary | ICD-10-CM

## 2022-02-16 LAB
GLUCOSE BLDC GLUCOMTR-MCNC: 101 MG/DL (ref 70–99)
HCG UR QL: NEGATIVE

## 2022-02-16 PROCEDURE — 370N000017 HC ANESTHESIA TECHNICAL FEE, PER MIN: Performed by: SURGERY

## 2022-02-16 PROCEDURE — 88302 TISSUE EXAM BY PATHOLOGIST: CPT

## 2022-02-16 PROCEDURE — 250N000011 HC RX IP 250 OP 636: Performed by: NURSE ANESTHETIST, CERTIFIED REGISTERED

## 2022-02-16 PROCEDURE — 258N000003 HC RX IP 258 OP 636: Performed by: NURSE ANESTHETIST, CERTIFIED REGISTERED

## 2022-02-16 PROCEDURE — 710N000012 HC RECOVERY PHASE 2, PER MINUTE: Performed by: SURGERY

## 2022-02-16 PROCEDURE — 250N000009 HC RX 250: Performed by: NURSE ANESTHETIST, CERTIFIED REGISTERED

## 2022-02-16 PROCEDURE — 250N000013 HC RX MED GY IP 250 OP 250 PS 637: Performed by: SURGERY

## 2022-02-16 PROCEDURE — 250N000009 HC RX 250: Performed by: SURGERY

## 2022-02-16 PROCEDURE — 999N000141 HC STATISTIC PRE-PROCEDURE NURSING ASSESSMENT: Performed by: SURGERY

## 2022-02-16 PROCEDURE — 360N000075 HC SURGERY LEVEL 2, PER MIN: Performed by: SURGERY

## 2022-02-16 PROCEDURE — 272N000001 HC OR GENERAL SUPPLY STERILE: Performed by: SURGERY

## 2022-02-16 PROCEDURE — 81025 URINE PREGNANCY TEST: CPT | Performed by: SURGERY

## 2022-02-16 PROCEDURE — 24075 EXC ARM/ELBOW LES SC < 3 CM: CPT | Performed by: NURSE ANESTHETIST, CERTIFIED REGISTERED

## 2022-02-16 PROCEDURE — 82962 GLUCOSE BLOOD TEST: CPT

## 2022-02-16 PROCEDURE — 24075 EXC ARM/ELBOW LES SC < 3 CM: CPT | Performed by: SURGERY

## 2022-02-16 RX ORDER — NALOXONE HYDROCHLORIDE 0.4 MG/ML
0.2 INJECTION, SOLUTION INTRAMUSCULAR; INTRAVENOUS; SUBCUTANEOUS
Status: DISCONTINUED | OUTPATIENT
Start: 2022-02-16 | End: 2022-02-16 | Stop reason: HOSPADM

## 2022-02-16 RX ORDER — NALOXONE HYDROCHLORIDE 0.4 MG/ML
0.4 INJECTION, SOLUTION INTRAMUSCULAR; INTRAVENOUS; SUBCUTANEOUS
Status: DISCONTINUED | OUTPATIENT
Start: 2022-02-16 | End: 2022-02-16 | Stop reason: HOSPADM

## 2022-02-16 RX ORDER — FENTANYL CITRATE 50 UG/ML
25 INJECTION, SOLUTION INTRAMUSCULAR; INTRAVENOUS EVERY 5 MIN PRN
Status: DISCONTINUED | OUTPATIENT
Start: 2022-02-16 | End: 2022-02-16 | Stop reason: HOSPADM

## 2022-02-16 RX ORDER — PROPOFOL 10 MG/ML
INJECTION, EMULSION INTRAVENOUS PRN
Status: DISCONTINUED | OUTPATIENT
Start: 2022-02-16 | End: 2022-02-16

## 2022-02-16 RX ORDER — ONDANSETRON 2 MG/ML
4 INJECTION INTRAMUSCULAR; INTRAVENOUS EVERY 30 MIN PRN
Status: DISCONTINUED | OUTPATIENT
Start: 2022-02-16 | End: 2022-02-16 | Stop reason: HOSPADM

## 2022-02-16 RX ORDER — ONDANSETRON 2 MG/ML
INJECTION INTRAMUSCULAR; INTRAVENOUS PRN
Status: DISCONTINUED | OUTPATIENT
Start: 2022-02-16 | End: 2022-02-16

## 2022-02-16 RX ORDER — ACETAMINOPHEN 325 MG/1
975 TABLET ORAL ONCE
Status: COMPLETED | OUTPATIENT
Start: 2022-02-16 | End: 2022-02-16

## 2022-02-16 RX ORDER — LIDOCAINE 40 MG/G
CREAM TOPICAL
Status: DISCONTINUED | OUTPATIENT
Start: 2022-02-16 | End: 2022-02-16 | Stop reason: HOSPADM

## 2022-02-16 RX ORDER — PROPOFOL 10 MG/ML
INJECTION, EMULSION INTRAVENOUS CONTINUOUS PRN
Status: DISCONTINUED | OUTPATIENT
Start: 2022-02-16 | End: 2022-02-16

## 2022-02-16 RX ORDER — LIDOCAINE HYDROCHLORIDE 20 MG/ML
INJECTION, SOLUTION INFILTRATION; PERINEURAL PRN
Status: DISCONTINUED | OUTPATIENT
Start: 2022-02-16 | End: 2022-02-16

## 2022-02-16 RX ORDER — SODIUM CHLORIDE, SODIUM LACTATE, POTASSIUM CHLORIDE, CALCIUM CHLORIDE 600; 310; 30; 20 MG/100ML; MG/100ML; MG/100ML; MG/100ML
INJECTION, SOLUTION INTRAVENOUS CONTINUOUS
Status: DISCONTINUED | OUTPATIENT
Start: 2022-02-16 | End: 2022-02-16 | Stop reason: HOSPADM

## 2022-02-16 RX ORDER — LIDOCAINE HYDROCHLORIDE AND EPINEPHRINE 10; 10 MG/ML; UG/ML
INJECTION, SOLUTION INFILTRATION; PERINEURAL PRN
Status: DISCONTINUED | OUTPATIENT
Start: 2022-02-16 | End: 2022-02-16 | Stop reason: HOSPADM

## 2022-02-16 RX ORDER — HYDROCODONE BITARTRATE AND ACETAMINOPHEN 5; 325 MG/1; MG/1
1 TABLET ORAL
Status: CANCELLED | OUTPATIENT
Start: 2022-02-16

## 2022-02-16 RX ORDER — KETOROLAC TROMETHAMINE 30 MG/ML
INJECTION, SOLUTION INTRAMUSCULAR; INTRAVENOUS PRN
Status: DISCONTINUED | OUTPATIENT
Start: 2022-02-16 | End: 2022-02-16

## 2022-02-16 RX ORDER — ONDANSETRON 4 MG/1
4 TABLET, ORALLY DISINTEGRATING ORAL EVERY 30 MIN PRN
Status: DISCONTINUED | OUTPATIENT
Start: 2022-02-16 | End: 2022-02-16 | Stop reason: HOSPADM

## 2022-02-16 RX ORDER — ACETAMINOPHEN 325 MG/1
650 TABLET ORAL EVERY 6 HOURS PRN
Qty: 50 TABLET | Refills: 0 | COMMUNITY
Start: 2022-02-16 | End: 2022-02-22

## 2022-02-16 RX ORDER — HYDROMORPHONE HYDROCHLORIDE 1 MG/ML
0.2 INJECTION, SOLUTION INTRAMUSCULAR; INTRAVENOUS; SUBCUTANEOUS EVERY 5 MIN PRN
Status: DISCONTINUED | OUTPATIENT
Start: 2022-02-16 | End: 2022-02-16 | Stop reason: HOSPADM

## 2022-02-16 RX ORDER — FENTANYL CITRATE 50 UG/ML
INJECTION, SOLUTION INTRAMUSCULAR; INTRAVENOUS PRN
Status: DISCONTINUED | OUTPATIENT
Start: 2022-02-16 | End: 2022-02-16

## 2022-02-16 RX ORDER — IBUPROFEN 200 MG
600 TABLET ORAL
Status: CANCELLED | OUTPATIENT
Start: 2022-02-16

## 2022-02-16 RX ADMIN — PROPOFOL 50 MG: 10 INJECTION, EMULSION INTRAVENOUS at 11:55

## 2022-02-16 RX ADMIN — ACETAMINOPHEN 975 MG: 325 TABLET, FILM COATED ORAL at 10:47

## 2022-02-16 RX ADMIN — KETOROLAC TROMETHAMINE 30 MG: 30 INJECTION, SOLUTION INTRAMUSCULAR at 12:09

## 2022-02-16 RX ADMIN — PROPOFOL 140 MCG/KG/MIN: 10 INJECTION, EMULSION INTRAVENOUS at 11:55

## 2022-02-16 RX ADMIN — ONDANSETRON HYDROCHLORIDE 4 MG: 2 SOLUTION INTRAMUSCULAR; INTRAVENOUS at 11:55

## 2022-02-16 RX ADMIN — SODIUM CHLORIDE, POTASSIUM CHLORIDE, SODIUM LACTATE AND CALCIUM CHLORIDE: 600; 310; 30; 20 INJECTION, SOLUTION INTRAVENOUS at 11:09

## 2022-02-16 RX ADMIN — LIDOCAINE HYDROCHLORIDE 20 MG: 20 INJECTION, SOLUTION INFILTRATION; PERINEURAL at 11:55

## 2022-02-16 RX ADMIN — MIDAZOLAM HYDROCHLORIDE 2 MG: 1 INJECTION, SOLUTION INTRAMUSCULAR; INTRAVENOUS at 11:55

## 2022-02-16 RX ADMIN — FENTANYL CITRATE 25 MCG: 50 INJECTION, SOLUTION INTRAMUSCULAR; INTRAVENOUS at 12:00

## 2022-02-16 NOTE — DISCHARGE INSTRUCTIONS
Procedure you had done: removal of right axillary lump  Your health care provider is:  Eufemia Beaver  Your surgeon is Dr. Janelle Khan.   Please call your health care provider or surgeon at [phone] if:    - you feel you are getting worse or having an increase in problems    - fever greater than 101 degrees  - increasing shortness of breath or chest pain  - any signs of infection (increasing redness, swelling, tenderness, warmth, change in appearance, or  increased drainage)  - nausea (upset stomach) and vomiting and/or diarrhea that will not stop  - severe pain that is not relieved by medicine, rest or ice    Home care :  You will be discharged when you are safe to go home. Anesthesia can change judgment, reaction time and coordination for several hours after you seem back to normal. Therefore, do not operate any motorized vehicles or power tools for 24 hours after discharge.     Activity:  You should rest or do quiet activities for the rest of the day. The day after surgery you may be as active as you feel able. You may find that you require more rest than usual the first 3-4 days as your body heals.      Diet:  Eat small amounts frequently after arriving home. Avoid foods that are hard to digest such as heavy, sweet, spicy, or fried foods until you are feeling well.   Vomiting can occur after general anesthesia. If vomiting lasts more than 5-7 times after arriving home, or if you have other difficulties, call your doctor.     Other:  1. Problems urinating can happen after surgery.  Please call your physician if you have any problems.  2. Some people have constipation after surgery-you can use over the counter stool softener or laxative as needed.  3. You can use ice on the area of the incision to help with pain and swelling. Apply an ice pack wrapped in a towel to the area for 10-15 minutes once an hour.   Dressing:  Your incision was covered with Steri-strips and a bandage. The bandage can be removed  and you can shower 24 hours or more after the surgery. After you shower dry your incision gently. You may apply a clean bandage if you want to. The Steri-strips will stay on for 5-7 days.   No soaking in a bath, hot tub, pool or lake for 5 days.      Drainage:   Bleeding or drainage should be minimal.  1. If bleeding soaks the dressings, cover with another sterile dressing.  2. If bleeding continues, apply gentle steady pressure over the incision for 5 minutes.  3. If bleeding persists or there is an increased swelling of the area, call your surgeon or go to the Emergency Room.

## 2022-02-16 NOTE — ANESTHESIA CARE TRANSFER NOTE
Patient: Leonor Oconnell    Procedure: Procedure(s):  EXCISION, MASS, AXILLARY       Diagnosis: Axillary mass [R22.30]  Diagnosis Additional Information: No value filed.    Anesthesia Type:   MAC     Note:    Oropharynx: oropharynx clear of all foreign objects  Level of Consciousness: drowsy  Oxygen Supplementation: nasal cannula  Level of Supplemental Oxygen (L/min / FiO2): 2  Independent Airway: airway patency satisfactory and stable  Dentition: dentition unchanged  Vital Signs Stable: post-procedure vital signs reviewed and stable  Report to RN Given: handoff report given  Patient transferred to: Phase II    Handoff Report: Identifed the Patient, Identified the Reponsible Provider, Reviewed the pertinent medical history, Discussed the surgical course, Reviewed Intra-OP anesthesia mangement and issues during anesthesia, Set expectations for post-procedure period and Allowed opportunity for questions and acknowledgement of understanding      Vitals:  Vitals Value Taken Time   BP     Temp     Pulse     Resp     SpO2         Electronically Signed By: FERNANDO MELENDREZ CRNA  February 16, 2022  12:17 PM

## 2022-02-16 NOTE — ANESTHESIA POSTPROCEDURE EVALUATION
Patient: Leonor Oconnell    Procedure: Procedure(s):  EXCISION, MASS, AXILLARY       Diagnosis:Axillary mass [R22.30]  Diagnosis Additional Information: No value filed.    Anesthesia Type:  MAC    Note:  Disposition: Outpatient   Postop Pain Control: Uneventful            Sign Out: Well controlled pain   PONV: No   Neuro/Psych: Uneventful            Sign Out: Acceptable/Baseline neuro status   Airway/Respiratory: Uneventful            Sign Out: Acceptable/Baseline resp. status   CV/Hemodynamics: Uneventful            Sign Out: Acceptable CV status   Other NRE: NONE   DID A NON-ROUTINE EVENT OCCUR? No           Last vitals:  Vitals Value Taken Time   BP 96/63 02/16/22 1223   Temp 96.5  F (35.8  C) 02/16/22 1223   Pulse 68 02/16/22 1223   Resp 10 02/16/22 1223   SpO2 97 % 02/16/22 1227   Vitals shown include unvalidated device data.    Electronically Signed By: FERNANDO MELENDREZ CRNA  February 16, 2022  12:28 PM

## 2022-02-16 NOTE — OR NURSING
Leonor Oconnell has been discharged to home at 1325 via ambulatory to private car accompanied by RN and SO    Written discharge instructions were provided to pt and SO.      Patient and adult caring for them verbalize understanding of discharge instructions including no driving until tomorrow and no longer taking narcotic pain medications - no operating mechanical equipment and no making any important decisions.They understand reason for discharge, and necessary follow-up appointments.

## 2022-02-16 NOTE — INTERVAL H&P NOTE
I have reviewed the surgical (or preoperative) H&P that is linked to this encounter, and examined the patient. There are no significant changes. I discussed excision of right axillary mass with the patient. Marking was completed.

## 2022-02-16 NOTE — OP NOTE
Preoperative Diagnosis: right axillary mass    Postoperative Diagnosis: same 1.5 x 1.0 x 1.0 cm      Procedure Planned: Excision of right axillary mass     Procedure Performed: Excision of superficial right axillary mass 1.5 x 1.0 x 1.0 cm     Surgeon: Janelle Khan MD   Circulator: Esteban Moore RN  Relief Circulator: Christy Wong RN  Relief Scrub: Michelle Alvarez  Scrub Person: Almza Veras  Anesthesia: Monitored anesthesia care, local     Specimen: right axillary mass     Estimated Blood Loss: less than 5mL     INDICATIONS   Please see the consultation. The patient presents with right axillary mass. The risks, benefits and alternatives to excision of the mass in the OR were discussed with the patient. We specifically discussed the risks of infection, bleeding, scarring, deformity and the possible need for further procedures. The patient expressed understanding and questions were answered. Informed consent paperwork was completed.     DESCRIPTION OF PROCEDURE   The patient was brought to the operating room and placed in a supine position on the operating table. Appropriate monitors were attached. After sedation was administered, the patient was positioned, prepped and draped in the standard fashion.  Time out was performed confirming the patient's identity and procedure to be performed.  The right axillary mass was palpated. Local anesthetic was infiltrated in the skin and subcutaneous tissue near the planned incision. Skin incision was made sharplyand carried down to the subcutaneous tissue. Dissection was carried out with electocautery around the mass. Hemostasis was appropriate. Once the specimen was dissected, the tissue was sent to pathology. The excision cavitywas irrigated with warm sterile saline and excellent hemostasis was obtained using electrocautery.   Further local anesthetic was infiltrated for postop pain control. Skin edges were approximated using running Monocryl suture. Sterile  dressing was applied. The patient was then awakened from sedation and taken to post anesthesia recovery in stable condition. All needle, sponge and instrument counts were reported as correct at the conclusion of the case. The patient tolerated the procedure with no immediately apparent complications.

## 2022-02-16 NOTE — ANESTHESIA PREPROCEDURE EVALUATION
Anesthesia Pre-Procedure Evaluation    Patient: Leonor Oconnell   MRN: [id number] : 1990        Preoperative Diagnosis: Axillary mass [R22.30]    Procedure : Procedure(s):  EXCISION, MASS, AXILLARY          Past Medical History:   Diagnosis Date     Gestational diabetes mellitus (GDM) in third trimester, gestational diabetes method of control unspecified 2016     History of other genital system and obstetric disorders     ,2-vessel cord; 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     Term pregnancy delivered 10/20/2016      Past Surgical History:   Procedure Laterality Date     ESOPHAGOSCOPY, GASTROSCOPY, DUODENOSCOPY (EGD), COMBINED      ,to retreive swallowed quarter      Allergies   Allergen Reactions     Cats      Other reaction(s): Sneezing     Dogs      Other reaction(s): Sneezing      Social History     Tobacco Use     Smoking status: Never Smoker     Smokeless tobacco: Never Used   Substance Use Topics     Alcohol use: Yes     Comment: monthly or less      Wt Readings from Last 1 Encounters:   22 57.6 kg (127 lb)        Anesthesia Evaluation            ROS/MED HX  ENT/Pulmonary:  - neg pulmonary ROS   (+) MICHAEL risk factors, snores loudly,     Neurologic:  - neg neurologic ROS     Cardiovascular:  - neg cardiovascular ROS     METS/Exercise Tolerance: >4 METS    Hematologic:  - neg hematologic  ROS     Musculoskeletal:  - neg musculoskeletal ROS     GI/Hepatic: Comment: Infrequent heartburn-tums prn      Renal/Genitourinary:  - neg Renal ROS     Endo:  - neg endo ROS     Psychiatric/Substance Use:  - neg psychiatric ROS     Infectious Disease:  - neg infectious disease ROS     Malignancy:  - neg malignancy ROS     Other:  - neg other ROS          Physical Exam    Airway        Mallampati: I   TM distance: > 3 FB   Neck ROM: full   Mouth opening: > 3 cm    Respiratory Devices and Support         Dental  no notable dental history         Cardiovascular   cardiovascular exam normal          Pulmonary    pulmonary exam normal                OUTSIDE LABS:  CBC: No results found for: WBC, HGB, HCT, PLT  BMP:   Lab Results   Component Value Date     01/05/2022     04/19/2019    POTASSIUM 4.0 01/05/2022    POTASSIUM 3.8 04/19/2019    CHLORIDE 101 01/05/2022    CHLORIDE 105 04/19/2019    CO2 27 01/05/2022    CO2 27 04/19/2019    BUN 14 01/05/2022    BUN 11 04/19/2019    CR 0.82 01/05/2022    CR 0.74 04/19/2019    GLC 93 01/05/2022     04/19/2019     COAGS: No results found for: PTT, INR, FIBR  POC:   Lab Results   Component Value Date    HCG Negative 02/04/2022     HEPATIC: No results found for: ALBUMIN, PROTTOTAL, ALT, AST, GGT, ALKPHOS, BILITOTAL, BILIDIRECT, YOUNG  OTHER:   Lab Results   Component Value Date    PATRICK 9.7 01/05/2022       Anesthesia Plan    ASA Status:  1   NPO Status:  NPO Appropriate    Anesthesia Type: MAC (agrees to LMA GA if needed).     - Reason for MAC: straight local not clinically adequate              Consents    Anesthesia Plan(s) and associated risks, benefits, and realistic alternatives discussed. Questions answered and patient/representative(s) expressed understanding.     - Discussed: Risks, Benefits and Alternatives for BOTH SEDATION and the PROCEDURE were discussed     - Discussed with:  Patient, Spouse      - Extended Intubation/Ventilatory Support Discussed: No.      - Patient is DNR/DNI Status: No    Use of blood products discussed: No .     Postoperative Care    Pain management: IV analgesics, Multi-modal analgesia.   PONV prophylaxis: Ondansetron (or other 5HT-3), Dexamethasone or Solumedrol     Comments:                FERNANDO MELENDREZ CRNA

## 2022-02-17 ENCOUNTER — MYC MEDICAL ADVICE (OUTPATIENT)
Dept: OBGYN | Facility: OTHER | Age: 32
End: 2022-02-17
Payer: COMMERCIAL

## 2022-02-21 LAB
PATH REPORT.COMMENTS IMP SPEC: NORMAL
PATH REPORT.FINAL DX SPEC: NORMAL
PATH REPORT.RELEVANT HX SPEC: NORMAL
PHOTO IMAGE: NORMAL

## 2022-02-22 ENCOUNTER — OFFICE VISIT (OUTPATIENT)
Dept: SURGERY | Facility: OTHER | Age: 32
End: 2022-02-22
Attending: SURGERY
Payer: COMMERCIAL

## 2022-02-22 VITALS
OXYGEN SATURATION: 98 % | HEART RATE: 88 BPM | RESPIRATION RATE: 16 BRPM | DIASTOLIC BLOOD PRESSURE: 70 MMHG | TEMPERATURE: 98 F | SYSTOLIC BLOOD PRESSURE: 110 MMHG

## 2022-02-22 DIAGNOSIS — Q83.1 ACCESSORY BREAST TISSUE OF AXILLA: ICD-10-CM

## 2022-02-22 DIAGNOSIS — Z48.817 AFTERCARE FOLLOWING SURGERY OF THE SKIN OR SUBCUTANEOUS TISSUE: Primary | ICD-10-CM

## 2022-02-22 PROCEDURE — 99024 POSTOP FOLLOW-UP VISIT: CPT | Performed by: SURGERY

## 2022-02-22 ASSESSMENT — PAIN SCALES - GENERAL: PAINLEVEL: NO PAIN (1)

## 2022-02-22 NOTE — NURSING NOTE
"Chief Complaint   Patient presents with     Surgical Followup     s/p axillary mass excision       Initial /70 (BP Location: Right arm, Patient Position: Sitting, Cuff Size: Adult Regular)   Pulse 88   Temp 98  F (36.7  C) (Tympanic)   Resp 16   LMP 02/08/2022   SpO2 98%  Estimated body mass index is 20.81 kg/m  as calculated from the following:    Height as of 2/16/22: 1.664 m (5' 5.5\").    Weight as of 2/16/22: 57.6 kg (127 lb).  Medication Reconciliation: complete    Maia Mayo LPN    "

## 2022-02-23 PROBLEM — Q83.1 ACCESSORY BREAST TISSUE OF AXILLA: Status: ACTIVE | Noted: 2022-02-23

## 2022-02-23 NOTE — PROGRESS NOTES
Patient presents for post surgical visit after right axillary mass excision on 2/16/22. Patient has done well. No problems with incision- a little pink but not painful.    /70 (BP Location: Right arm, Patient Position: Sitting, Cuff Size: Adult Regular)   Pulse 88   Temp 98  F (36.7  C) (Tympanic)   Resp 16   LMP 02/08/2022   SpO2 98%   General: NAD, pleasant and cooperative with exam and interview.  Right axilla: healing incision. No sign of infection. No pain with palpation.  Psychiatry: awake, alert and oriented. Appropriate affect.  Pathology results: axillary breat tissue with no atypia or malignancy  Assessment/Plan:  Discussed surgery and pathology results. Patient can return to normal activities. Patient will call with questions or concerns.

## 2022-03-22 ENCOUNTER — OFFICE VISIT (OUTPATIENT)
Dept: OBGYN | Facility: OTHER | Age: 32
End: 2022-03-22
Attending: OBSTETRICS & GYNECOLOGY
Payer: COMMERCIAL

## 2022-03-22 VITALS
BODY MASS INDEX: 20.98 KG/M2 | HEART RATE: 80 BPM | DIASTOLIC BLOOD PRESSURE: 60 MMHG | SYSTOLIC BLOOD PRESSURE: 116 MMHG | WEIGHT: 128 LBS

## 2022-03-22 DIAGNOSIS — N87.9 CERVICAL DYSPLASIA: Primary | ICD-10-CM

## 2022-03-22 DIAGNOSIS — Z01.812 PRE-PROCEDURAL LABORATORY EXAMINATIONS: ICD-10-CM

## 2022-03-22 LAB — HCG UR QL: NEGATIVE

## 2022-03-22 PROCEDURE — 88307 TISSUE EXAM BY PATHOLOGIST: CPT

## 2022-03-22 PROCEDURE — 57460 BX OF CERVIX W/SCOPE LEEP: CPT | Performed by: OBSTETRICS & GYNECOLOGY

## 2022-03-22 PROCEDURE — 81025 URINE PREGNANCY TEST: CPT | Mod: ZL | Performed by: OBSTETRICS & GYNECOLOGY

## 2022-03-22 PROCEDURE — 88305 TISSUE EXAM BY PATHOLOGIST: CPT

## 2022-03-22 ASSESSMENT — PAIN SCALES - GENERAL: PAINLEVEL: NO PAIN (0)

## 2022-03-22 NOTE — NURSING NOTE
Chief Complaint   Patient presents with     Leep       Medication Reconciliation: complete   Prior to the start of the procedure and with procedural staff participation, I verbally confirmed the patient s identity using two indicators, relevant allergies, that the procedure was appropriate and matched the consent or emergent situation, and that the correct equipment/implants were available. Immediately prior to starting the procedure I conducted the Time Out with the procedural staff and re-confirmed the patient s name, procedure, and site/side. (The Joint Commission universal protocol was followed.)  Yes    Sedation (Moderate or Deep): None      Rossana Montanez LPN........................3/22/2022  2:05 PM

## 2022-03-22 NOTE — PROGRESS NOTES
LEEP Procedure Visit    31 year old  presents for LEEP.  She had a Pap on 22 that revealed NIL but HPV+ not 16, 18 and coloposcopic directed biopsies on 22 that revealed SHILO II-III.   LEEP was recommended.  Discussed possible risks including cervical incompetence, infection, bleeding, discomfort, and possible incomplete excision of the abnormal tissue so close follow up was necessary. Consent was obtained and all questions were answered.    Today she has no complaints.     Breastfeeding No      Urine Pregnancy Test: negative     Procedure: LEEP    Indication:  SHILO II-III    The procedure, its risks and goals as well as complications were discussed with the patient.  She agreed to proceed.  She was placed in the dorsal lithotomy position and a coated speculum inserted into the vagina.  The cervix was exposed.  A preliminary colposcopy exam was performed using Lugol's solution.  The cervix was injected with a solution of 2% Lidocaine with 1:200,000 epinephrine.  Following this, a LEEP of the cervix was carried out in 1 pass. A second top hat LEEP pass was then performed.  A post leep ECC was performed.  The bed of the LEEP was treated with electrocautery and Monsel's solution.  The instruments were removed.     The patient tolerated the procedure well     31 year old  with SHILO II-III. Will contact patient with pathology report and follow up plan.   Post-LEEP instructions were given to the patient.  She was told to expect heavy discharge for the first 4-7 days and could continue for 2 weeks. Nothing in the vagina and no intercourse for 4-6 weeks.  No heavy lifting for next few days.  Return for any signs of infection or heavy bleeding.    Bernadine Harris MD

## 2022-03-25 LAB
PATH REPORT.COMMENTS IMP SPEC: NORMAL
PATH REPORT.FINAL DX SPEC: NORMAL
PHOTO IMAGE: NORMAL

## 2022-09-17 ENCOUNTER — HEALTH MAINTENANCE LETTER (OUTPATIENT)
Age: 32
End: 2022-09-17

## 2022-12-12 ENCOUNTER — MYC MEDICAL ADVICE (OUTPATIENT)
Dept: FAMILY MEDICINE | Facility: OTHER | Age: 32
End: 2022-12-12

## 2022-12-12 DIAGNOSIS — B00.1 COLD SORE: Primary | ICD-10-CM

## 2022-12-12 RX ORDER — VALACYCLOVIR HYDROCHLORIDE 1 G/1
2000 TABLET, FILM COATED ORAL 2 TIMES DAILY
Qty: 20 TABLET | Refills: 3 | Status: SHIPPED | OUTPATIENT
Start: 2022-12-12 | End: 2024-04-02

## 2023-02-08 ENCOUNTER — OFFICE VISIT (OUTPATIENT)
Dept: FAMILY MEDICINE | Facility: OTHER | Age: 33
End: 2023-02-08
Attending: PHYSICIAN ASSISTANT
Payer: COMMERCIAL

## 2023-02-08 VITALS
RESPIRATION RATE: 16 BRPM | BODY MASS INDEX: 21.74 KG/M2 | HEART RATE: 97 BPM | SYSTOLIC BLOOD PRESSURE: 100 MMHG | HEIGHT: 65 IN | TEMPERATURE: 98.2 F | OXYGEN SATURATION: 98 % | DIASTOLIC BLOOD PRESSURE: 63 MMHG | WEIGHT: 130.5 LBS

## 2023-02-08 DIAGNOSIS — Z11.4 SCREENING FOR HIV (HUMAN IMMUNODEFICIENCY VIRUS): ICD-10-CM

## 2023-02-08 DIAGNOSIS — H54.3 DECREASED VISION IN BOTH EYES: ICD-10-CM

## 2023-02-08 DIAGNOSIS — Z11.59 ENCOUNTER FOR HEPATITIS C SCREENING TEST FOR LOW RISK PATIENT: ICD-10-CM

## 2023-02-08 DIAGNOSIS — Z00.00 ROUTINE GENERAL MEDICAL EXAMINATION AT A HEALTH CARE FACILITY: Primary | ICD-10-CM

## 2023-02-08 DIAGNOSIS — Z13.1 SCREENING FOR DIABETES MELLITUS: ICD-10-CM

## 2023-02-08 DIAGNOSIS — Z01.419 PAP TEST, AS PART OF ROUTINE GYNECOLOGICAL EXAMINATION: ICD-10-CM

## 2023-02-08 DIAGNOSIS — Z13.220 SCREENING CHOLESTEROL LEVEL: ICD-10-CM

## 2023-02-08 LAB
ANION GAP SERPL CALCULATED.3IONS-SCNC: 6 MMOL/L (ref 7–15)
BUN SERPL-MCNC: 10.7 MG/DL (ref 6–20)
CALCIUM SERPL-MCNC: 9.2 MG/DL (ref 8.6–10)
CHLORIDE SERPL-SCNC: 103 MMOL/L (ref 98–107)
CHOLEST SERPL-MCNC: 229 MG/DL
CREAT SERPL-MCNC: 0.84 MG/DL (ref 0.51–0.95)
DEPRECATED HCO3 PLAS-SCNC: 28 MMOL/L (ref 22–29)
GFR SERPL CREATININE-BSD FRML MDRD: >90 ML/MIN/1.73M2
GLUCOSE SERPL-MCNC: 97 MG/DL (ref 70–99)
HDLC SERPL-MCNC: 71 MG/DL
LDLC SERPL CALC-MCNC: 144 MG/DL
NONHDLC SERPL-MCNC: 158 MG/DL
POTASSIUM SERPL-SCNC: 3.8 MMOL/L (ref 3.4–5.3)
SODIUM SERPL-SCNC: 137 MMOL/L (ref 136–145)
TRIGL SERPL-MCNC: 69 MG/DL

## 2023-02-08 PROCEDURE — 99395 PREV VISIT EST AGE 18-39: CPT | Performed by: PHYSICIAN ASSISTANT

## 2023-02-08 PROCEDURE — 86803 HEPATITIS C AB TEST: CPT | Mod: ZL | Performed by: PHYSICIAN ASSISTANT

## 2023-02-08 PROCEDURE — 80048 BASIC METABOLIC PNL TOTAL CA: CPT | Mod: ZL | Performed by: PHYSICIAN ASSISTANT

## 2023-02-08 PROCEDURE — 80061 LIPID PANEL: CPT | Mod: ZL | Performed by: PHYSICIAN ASSISTANT

## 2023-02-08 PROCEDURE — 87389 HIV-1 AG W/HIV-1&-2 AB AG IA: CPT | Mod: ZL | Performed by: PHYSICIAN ASSISTANT

## 2023-02-08 PROCEDURE — 87624 HPV HI-RISK TYP POOLED RSLT: CPT | Mod: ZL | Performed by: PHYSICIAN ASSISTANT

## 2023-02-08 PROCEDURE — 36415 COLL VENOUS BLD VENIPUNCTURE: CPT | Mod: ZL | Performed by: PHYSICIAN ASSISTANT

## 2023-02-08 PROCEDURE — G0123 SCREEN CERV/VAG THIN LAYER: HCPCS | Performed by: PHYSICIAN ASSISTANT

## 2023-02-08 ASSESSMENT — ENCOUNTER SYMPTOMS
PARESTHESIAS: 0
HEMATURIA: 0
FREQUENCY: 0
COUGH: 0
ARTHRALGIAS: 0
HEMATOCHEZIA: 0
FEVER: 0
JOINT SWELLING: 0
WEAKNESS: 0
SHORTNESS OF BREATH: 0
CHILLS: 0
NAUSEA: 0
EYE PAIN: 0
ABDOMINAL PAIN: 0
HEARTBURN: 0
DYSURIA: 0
PALPITATIONS: 0
HEADACHES: 0
CONSTIPATION: 0
MYALGIAS: 0
SORE THROAT: 0
NERVOUS/ANXIOUS: 0
BREAST MASS: 0
DIARRHEA: 0
DIZZINESS: 0

## 2023-02-08 ASSESSMENT — PAIN SCALES - GENERAL: PAINLEVEL: NO PAIN (0)

## 2023-02-08 NOTE — PATIENT INSTRUCTIONS
"Healthy Strategies  Eat at least 3 meals a day and never skip breakfast.  Eat more slowly.  Decrease portion size.  Provide structure by using meal replacement bars or shakes, and/or low calorie frozen meals.  For good nutrition incorporate fruit, vegetables, whole grains, lean protein, and low-fat dairy.  Remove trigger foods from yourenvironment to avoid impulse eating.  Increase physical activity: get a pedometer and aim for 10,000 steps a day or 30-35 minutes of activity 5 days per week.  Weigh yourself daily or at least weekly.  Keep a record of what you eat and your activity.  Establish a support system such as afriend, group or program.    11. Read Catracho Squires's \"Eat to Live\". Remember it is important to have a minimum of 1200 calories a day, okay to use olive oil, 40 grams of fiber daily. No more than two servings (the size of your palm) of red meat a week.     Please consider the following general health recommendations:    Eat a quality diet (generally, low in simple sugars, starches, cholesterol and saturated fat.)    Please get 1200 mg of calcium in divided doses with 800 units vitamin D in your diet daily. Take supplements as needed to obtain full recommended amounts.     Stay physically active. Regular walking or other exercise is one of the best ways to minimize pain of arthritis; maintain independence and mobility; maintain bone strength; maintain conditioning of your heart. Find something you enjoy and a friend to do it with you.    Maintain ideal weight. Your Body mass index is Body mass index is 21.72 kg/m .. Generally a BMI of 20-25 is considered ideal. Overweight is defined as 25-30, obese is 30-35 and markedly obese is greater than 35.    Apply sun block (SPF 25 or greater) on exposed skin anytime you are out in the sun to prevent skin cancer.     Wear a seatbelt whenever you are in a car.    Obtain a flu shot every fall.    You should have a tetanus booster at least once every 10 " years.    Check blood sugar annually. Cholesterol annually unless you have had a normal level when last checked within 5 years.     I recommend that you have a general physical exam every year. You should have a pap test every 3 years between the ages of 21 and 30 and every 3-5 years between the ages of 30 and 65 depending on your test unless you have had previous abnormal pap smears, (in these cases the exams and PAP's should be done on a schedule as recommended by your primary care provider). If you have had hysterectomy in the past, your future Pap plan may be different.      Preventive Health Recommendations  Female Ages 26 - 39  Yearly exam:   See your health care provider every year in order to  Review health changes.   Discuss preventive care.    Review your medicines if you your doctor has prescribed any.    Until age 30: Get a Pap test every three years (more often if you have had an abnormal result).    After age 30: Talk to your doctor about whether you should have a Pap test every 3 years or have a Pap test with HPV screening every 5 years.   You do not need a Pap test if your uterus was removed (hysterectomy) and you have not had cancer.  You should be tested each year for STDs (sexually transmitted diseases), if you're at risk.   Talk to your provider about how often to have your cholesterol checked.  If you are at risk for diabetes, you should have a diabetes test (fasting glucose).  Shots: Get a flu shot each year. Get a tetanus shot every 10 years.   Nutrition:   Eat at least 5 servings of fruits and vegetables each day.  Eat whole-grain bread, whole-wheat pasta and brown rice instead of white grains and rice.  Get adequate Calcium and Vitamin D.     Lifestyle  Exercise at least 150 minutes a week (30 minutes a day, 5 days of the week). This will help you control your weight and prevent disease.  Limit alcohol to one drink per day.  No smoking.   Wear sunscreen to prevent skin cancer.  See your  dentist every six months for an exam and cleaning.

## 2023-02-08 NOTE — NURSING NOTE
Pt presents to clinic today for a physical with a pap.       FOOD SECURITY SCREENING QUESTIONS:    The next two questions are to help us understand your food security.  If you are feeling you need any assistance in this area, we have resources available to support you today.    Hunger Vital Signs:  Within the past 12 months we worried whether our food would run out before we got money to buy more. Never  Within the past 12 months the food we bought just didn't last and we didn't have money to get more. Never        Advance care directive on file? no  Advance care directive provided to patient? no     Medication Reconciliation: complete  Virgilio Dominguez LPN,LPN on 2/8/2023 at 9:35 AM

## 2023-02-08 NOTE — PROGRESS NOTES
SUBJECTIVE:   CC: Leonor is an 32 year old who presents for preventive health visit.   Patient has been advised of split billing requirements and indicates understanding: Yes  Healthy Habits:     Getting at least 3 servings of Calcium per day:  Yes    Bi-annual eye exam:  NO    Dental care twice a year:  Yes    Sleep apnea or symptoms of sleep apnea:  None    Diet:  Regular (no restrictions)    Frequency of exercise:  2-3 days/week    Duration of exercise:  Less than 15 minutes    Taking medications regularly:  Yes    Medication side effects:  None    PHQ-2 Total Score: 0    Additional concerns today:  No    Patient's last menstrual period was 2023 (approximate).   Contraception: none  Risk for STI?: no concerns  Last pap:   2019 - normal pap and positive HPV  2022-normal Pap and positive HPV  2022-high-grade, LEEP recommended  3/22/2022: LEEP benign, repeat Pap and HPV in 1 year  Needs to be repeated  Any hx of abnormal paps:  yes  FH of early CA?: no  Cholesterol/DM concerns/screenin2022  Tobacco?: no  Lung cancer screening: na  Calcium intake: yes  DEXA: na  Last mammo: na  Colonoscopy: na  Hepatitis C screen: none, completed   HIV screen: none, completed   Immunizations: declines vaccines at this time     Today's PHQ-2 Score:   PHQ-2 (  [employer]) 2023   Q1: Little interest or pleasure in doing things 0   Q2: Feeling down, depressed or hopeless 0   PHQ-2 Score 0   PHQ-2 Total Score (12-17 Years)- Positive if 3 or more points; Administer PHQ-A if positive -   Q1: Little interest or pleasure in doing things Not at all   Q2: Feeling down, depressed or hopeless Not at all   PHQ-2 Score 0       Have you ever done Advance Care Planning? (For example, a Health Directive, POLST, or a discussion with a medical provider or your loved ones about your wishes): No, advance care planning information given to patient to review.  Patient declined advance care planning discussion at this  time.    Social History     Tobacco Use     Smoking status: Never     Smokeless tobacco: Never   Substance Use Topics     Alcohol use: Yes     Comment: monthly or less         Alcohol Use 2/8/2023   Prescreen: >3 drinks/day or >7 drinks/week? No       Reviewed orders with patient.  Reviewed health maintenance and updated orders accordingly - Yes  Labs reviewed in EPIC  BP Readings from Last 3 Encounters:   02/08/23 100/63   03/22/22 116/60   02/22/22 110/70    Wt Readings from Last 3 Encounters:   02/08/23 59.2 kg (130 lb 8 oz)   03/22/22 58.1 kg (128 lb)   02/16/22 57.6 kg (127 lb)                  Patient Active Problem List   Diagnosis     Multigravida     Accessory breast tissue of axilla     Past Surgical History:   Procedure Laterality Date     ESOPHAGOSCOPY, GASTROSCOPY, DUODENOSCOPY (EGD), COMBINED      1999,to retreive swallowed quarter     EXCISE MASS AXILLA Right 2/16/2022    Procedure: EXCISION, MASS, AXILLARY;  Surgeon: Janelle Khan MD;  Location:  OR       Social History     Tobacco Use     Smoking status: Never     Smokeless tobacco: Never   Substance Use Topics     Alcohol use: Yes     Comment: monthly or less     Family History   Problem Relation Age of Onset     Hypertension Father         Hypertension     Hyperlipidemia Father         Hyperlipidemia     Diabetes Paternal Grandmother         Diabetes     Diabetes Paternal Aunt         Diabetes         Current Outpatient Medications   Medication Sig Dispense Refill     valACYclovir (VALTREX) 1000 mg tablet Take 2 tablets (2,000 mg) by mouth 2 times daily for 1 day 20 tablet 3     Allergies   Allergen Reactions     Cats      Other reaction(s): Sneezing     Dogs      Other reaction(s): Sneezing     Recent Labs   Lab Test 01/05/22  1209 04/19/19  1347   *  --    HDL 62  --    TRIG 157*  --    CR 0.82 0.74   GFRESTIMATED >90 >90   GFRESTBLACK  --  >90   POTASSIUM 4.0 3.8        Breast Cancer Screening:  Any new diagnosis of family breast,  ovarian, or bowel cancer? No    FHS-7: No flowsheet data found.    Patient under 40 years of age: Routine Mammogram Screening not recommended.   Pertinent mammograms are reviewed under the imaging tab.    History of abnormal Pap smear:   Last 3 Pap and HPV Results:   PAP / HPV Latest Ref Rng & Units 2022   PAP   Negative for Intraepithelial Lesion or Malignancy (NILM) -   PAP (Historical) - - NIL   HPV16 Negative Negative Negative   HPV18 Negative Negative Negative   HRHPV Negative Positive(A) Positive(A)     PAP / HPV Latest Ref Rng & Units 2022   PAP   Negative for Intraepithelial Lesion or Malignancy (NILM) -   PAP (Historical) - - NIL   HPV16 Negative Negative Negative   HPV18 Negative Negative Negative   HRHPV Negative Positive(A) Positive(A)     Reviewed and updated as needed this visit by clinical staff   Tobacco  Allergies  Meds  Problems  Med Hx  Surg Hx  Fam Hx          Reviewed and updated as needed this visit by Provider   Tobacco  Allergies  Meds  Problems  Med Hx  Surg Hx  Fam Hx         Past Medical History:   Diagnosis Date     Gestational diabetes mellitus (GDM) in third trimester, gestational diabetes method of control unspecified 2016     History of other genital system and obstetric disorders     ,2-vessel cord; 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     Term pregnancy delivered 10/20/2016      Past Surgical History:   Procedure Laterality Date     ESOPHAGOSCOPY, GASTROSCOPY, DUODENOSCOPY (EGD), COMBINED      ,to retreive swallowed quarter     EXCISE MASS AXILLA Right 2022    Procedure: EXCISION, MASS, AXILLARY;  Surgeon: Janelle Khan MD;  Location: [facility] OR     OB History    Para Term  AB Living   2 2 2 0 0 2   SAB IAB Ectopic Multiple Live Births   0 0 0 0 2      # Outcome Date GA Lbr Bimal/2nd Weight Sex Delivery Anes PTL Lv   2 Term 11 40w5d  3.515 kg (7 lb 12 oz) F Vag-Spont   JESSEE      Name: Whitley Laurasanchez      Apgar1: 8  Apgar5: 9  "  1 Term      Vag-Spont   JESSEE       Review of Systems   Constitutional: Negative for chills and fever.   HENT: Negative for congestion, ear pain, hearing loss and sore throat.    Eyes: Negative for pain and visual disturbance.   Respiratory: Negative for cough and shortness of breath.    Cardiovascular: Negative for chest pain, palpitations and peripheral edema.   Gastrointestinal: Negative for abdominal pain, constipation, diarrhea, heartburn, hematochezia and nausea.   Breasts:  Negative for tenderness, breast mass and discharge.   Genitourinary: Negative for dysuria, frequency, genital sores, hematuria, pelvic pain, urgency, vaginal bleeding and vaginal discharge.   Musculoskeletal: Negative for arthralgias, joint swelling and myalgias.   Skin: Negative for rash.   Neurological: Negative for dizziness, weakness, headaches and paresthesias.   Psychiatric/Behavioral: Negative for mood changes. The patient is not nervous/anxious.         OBJECTIVE:   /63 (BP Location: Right arm, Patient Position: Sitting, Cuff Size: Adult Regular)   Pulse 97   Temp 98.2  F (36.8  C) (Tympanic)   Resp 16   Ht 1.651 m (5' 5\")   Wt 59.2 kg (130 lb 8 oz)   LMP 01/30/2023 (Approximate)   SpO2 98%   BMI 21.72 kg/m    Physical Exam  GENERAL: healthy, alert and no distress  EYES: Eyes grossly normal to inspection, PERRL and conjunctivae and sclerae normal  HENT: ear canals and TM's normal, nose and mouth without ulcers or lesions  NECK: no adenopathy, no asymmetry, masses, or scars and thyroid normal to palpation  RESP: lungs clear to auscultation - no rales, rhonchi or wheezes  CV: regular rate and rhythm, normal S1 S2, no S3 or S4, no murmur, click or rub, no peripheral edema and peripheral pulses strong  ABDOMEN: soft, nontender, no hepatosplenomegaly, no masses and bowel sounds normal   (female): normal female external genitalia, normal urethral meatus, vaginal mucosa pink, moist, well rugated, and normal " cervix/adnexa/uterus without masses or discharge  Pap completed: yes  MS: no gross musculoskeletal defects noted, no edema  SKIN: no suspicious lesions or rashes  NEURO: Normal strength and tone, mentation intact and speech normal  PSYCH: mentation appears normal, affect normal/bright    Diagnostic Test Results:  Labs reviewed in Epic    ASSESSMENT/PLAN:       ICD-10-CM    1. Routine general medical examination at a health care facility  Z00.00       2. Pap test, as part of routine gynecological examination  Z01.419 Pap Screen with HPV - recommended age 30 - 65 years      3. Screening cholesterol level  Z13.220 Lipid Panel      4. Screening for diabetes mellitus  Z13.1 Basic Metabolic Panel      5. Encounter for hepatitis C screening test for low risk patient  Z11.59 Hepatitis C Screen Reflex to HCV RNA Quant and Genotype      6. Screening for HIV (human immunodeficiency virus)  Z11.4 HIV Antigen Antibody Combo      7. Decreased vision in both eyes  H54.3 Adult Eye  Referral        Completed Pap and HPV for cervical cancer screening.  Complete lipid panel and BMP for cholesterol and diabetes mellitus screening.  Completed low risk HIV and hepatitis C screen.    Refer to ophthalmology for eye check with decreased vision in both eyes.    Gave patient education.  Repeat physical in 1 year.    Patient has been advised of split billing requirements and indicates understanding: Yes      COUNSELING:  Reviewed preventive health counseling, as reflected in patient instructions       Regular exercise       Healthy diet/nutrition       Vision screening       Hearing screening       Safe sex practices/STD prevention       Consider Hep C screening for all patients one time for ages 18-79 years       HIV screeninx in teen years, 1x in adult years, and at intervals if high risk        She reports that she has never smoked. She has never used smokeless tobacco.      Eufemia Beaver PA-C  [facility] AND  [facility]

## 2023-02-10 LAB — HIV 1+2 AB+HIV1 P24 AG SERPL QL IA: NONREACTIVE

## 2023-02-13 LAB — HCV AB SERPL QL IA: ABNORMAL

## 2023-02-14 ENCOUNTER — MYC MEDICAL ADVICE (OUTPATIENT)
Dept: FAMILY MEDICINE | Facility: OTHER | Age: 33
End: 2023-02-14
Payer: COMMERCIAL

## 2023-02-14 DIAGNOSIS — Z11.59 ENCOUNTER FOR HEPATITIS C SCREENING TEST FOR LOW RISK PATIENT: Primary | ICD-10-CM

## 2023-02-16 LAB
HUMAN PAPILLOMA VIRUS 16 DNA: NEGATIVE
HUMAN PAPILLOMA VIRUS 18 DNA: NEGATIVE
HUMAN PAPILLOMA VIRUS FINAL DIAGNOSIS: NORMAL
HUMAN PAPILLOMA VIRUS OTHER HR: NEGATIVE

## 2023-02-17 ENCOUNTER — LAB (OUTPATIENT)
Dept: LAB | Facility: OTHER | Age: 33
End: 2023-02-17
Attending: FAMILY MEDICINE
Payer: COMMERCIAL

## 2023-02-17 DIAGNOSIS — Z11.59 ENCOUNTER FOR HEPATITIS C SCREENING TEST FOR LOW RISK PATIENT: ICD-10-CM

## 2023-02-17 PROCEDURE — 86803 HEPATITIS C AB TEST: CPT | Mod: ZL

## 2023-02-17 PROCEDURE — 87522 HEPATITIS C REVRS TRNSCRPJ: CPT | Mod: ZL

## 2023-02-17 PROCEDURE — 36415 COLL VENOUS BLD VENIPUNCTURE: CPT | Mod: ZL

## 2023-02-20 LAB
HCV AB SERPL QL IA: ABNORMAL
HCV RNA SERPL NAA+PROBE-ACNC: NOT DETECTED IU/ML

## 2024-04-02 ENCOUNTER — MYC MEDICAL ADVICE (OUTPATIENT)
Dept: FAMILY MEDICINE | Facility: OTHER | Age: 34
End: 2024-04-02
Payer: COMMERCIAL

## 2024-04-02 NOTE — TELEPHONE ENCOUNTER
See refill encounter where this request is processed and sent to PCP for review/signing.     Mariia Reyes RN on 4/2/2024 at 12:29 PM

## 2025-05-17 ENCOUNTER — HEALTH MAINTENANCE LETTER (OUTPATIENT)
Age: 35
End: 2025-05-17

## 2025-05-28 ENCOUNTER — MYC REFILL (OUTPATIENT)
Dept: FAMILY MEDICINE | Facility: OTHER | Age: 35
End: 2025-05-28
Payer: COMMERCIAL

## 2025-05-28 DIAGNOSIS — B00.1 COLD SORE: ICD-10-CM

## 2025-05-29 RX ORDER — VALACYCLOVIR HYDROCHLORIDE 1 G/1
TABLET, FILM COATED ORAL
Qty: 20 TABLET | Refills: 3 | Status: SHIPPED | OUTPATIENT
Start: 2025-05-29

## 2025-05-29 NOTE — TELEPHONE ENCOUNTER
"\"Hi! I requested a refill already but wanted to also send a message to get my cold sore medicine filled asap because it won t take long for my cold sore to multiply.\"    Requested Prescriptions   Pending Prescriptions Disp Refills    valACYclovir (VALTREX) 1000 mg tablet 20 tablet 3       Antivirals Failed - 5/29/2025  7:34 AM        Failed - Medication is active on med list and the sig matches. RN to manually verify dose and sig if red X/fail.     If the protocol passes (green check), you do not need to verify med dose and sig.    A prescription matches if they are the same clinical intention.    For Example: once daily and every morning are the same.    The protocol can not identify upper and lower case letters as matching and will fail.     For Example: Take 1 tablet (50 mg) by mouth daily     TAKE 1 TABLET (50 MG) BY MOUTH DAILY    For all fails (red x), verify dose and sig.    If the refill does match what is on file, the RN can still proceed to approve the refill request.       If they do not match, route to the appropriate provider.             Failed - Recent (12 month) or future (90 days) visit with authorizing provider's specialty (provided they have been seen in the past 15 months)     The patient must have completed an in-person or virtual visit within the past 12 months or has a future visit scheduled within the next 90 days with the authorizing provider s specialty.  Urgent care and e-visits do not qualify as an office visit for this protocol.         Last Prescription Date:   4/2/2024  Last Fill Qty/Refills:         20, R-3    Last Office Visit:              2/8/2023    Future Office visit:            None    Mariia Reyes RN on 5/29/2025 at 7:35 AM        "

## (undated) DEVICE — SOL WATER 1500ML

## (undated) DEVICE — COVER LIGHT HANDLE LT-F02

## (undated) DEVICE — PREP CHLORAPREP 26ML TINTED ORANGE  260815

## (undated) DEVICE — DRSG STERI STRIP 1/4X3" R1541

## (undated) DEVICE — SU MONOCRYL 4-0 PS-2 27" UND Y426H

## (undated) DEVICE — ESU ELEC BLADE 2.75" COATED/INSULATED E1455

## (undated) DEVICE — ESU PENCIL SMOKE EVAC W/ROCKER SWITCH 0703-047-000

## (undated) DEVICE — GLOVE PROTEXIS BLUE W/NEU-THERA 6.5  2D73EB65

## (undated) DEVICE — GLOVE PROTEXIS POWDER FREE SMT 6.5  2D72PT65X

## (undated) DEVICE — PACK MAJOR LAPAROTOMY LF SBA15MLFCA

## (undated) RX ORDER — ACETAMINOPHEN 325 MG/1
TABLET ORAL
Status: DISPENSED
Start: 2022-02-16

## (undated) RX ORDER — KETOROLAC TROMETHAMINE 30 MG/ML
INJECTION, SOLUTION INTRAMUSCULAR; INTRAVENOUS
Status: DISPENSED
Start: 2022-02-16

## (undated) RX ORDER — LIDOCAINE HYDROCHLORIDE AND EPINEPHRINE 10; 10 MG/ML; UG/ML
INJECTION, SOLUTION INFILTRATION; PERINEURAL
Status: DISPENSED
Start: 2022-02-16

## (undated) RX ORDER — FENTANYL CITRATE 50 UG/ML
INJECTION, SOLUTION INTRAMUSCULAR; INTRAVENOUS
Status: DISPENSED
Start: 2022-02-16

## (undated) RX ORDER — ONDANSETRON 2 MG/ML
INJECTION INTRAMUSCULAR; INTRAVENOUS
Status: DISPENSED
Start: 2022-02-16

## (undated) RX ORDER — PROPOFOL 10 MG/ML
INJECTION, EMULSION INTRAVENOUS
Status: DISPENSED
Start: 2022-02-16

## (undated) RX ORDER — LIDOCAINE HYDROCHLORIDE 20 MG/ML
INJECTION, SOLUTION EPIDURAL; INFILTRATION; INTRACAUDAL; PERINEURAL
Status: DISPENSED
Start: 2022-02-16